# Patient Record
Sex: MALE | Race: WHITE | NOT HISPANIC OR LATINO | Employment: FULL TIME | URBAN - METROPOLITAN AREA
[De-identification: names, ages, dates, MRNs, and addresses within clinical notes are randomized per-mention and may not be internally consistent; named-entity substitution may affect disease eponyms.]

---

## 2023-07-07 LAB — PROSTATE SPECIFIC ANTIGEN,SCREEN: 2.58 NG/ML (ref 0–4)

## 2024-02-12 ENCOUNTER — OFFICE VISIT (OUTPATIENT)
Dept: UROLOGY | Facility: CLINIC | Age: 79
End: 2024-02-12
Payer: COMMERCIAL

## 2024-02-12 DIAGNOSIS — N40.0 BENIGN PROSTATIC HYPERPLASIA, UNSPECIFIED WHETHER LOWER URINARY TRACT SYMPTOMS PRESENT: ICD-10-CM

## 2024-02-12 LAB
POC BILIRUBIN, URINE: NEGATIVE
POC BLOOD, URINE: NEGATIVE
POC GLUCOSE, URINE: ABNORMAL MG/DL
POC KETONES, URINE: NEGATIVE MG/DL
POC LEUKOCYTES, URINE: NEGATIVE
POC NITRITE,URINE: NEGATIVE
POC PH, URINE: 5.5 PH
POC PROTEIN, URINE: NEGATIVE MG/DL
POC SPECIFIC GRAVITY, URINE: >=1.03
POC UROBILINOGEN, URINE: 0.2 EU/DL

## 2024-02-12 PROCEDURE — 99213 OFFICE O/P EST LOW 20 MIN: CPT | Performed by: UROLOGY

## 2024-02-12 PROCEDURE — 81003 URINALYSIS AUTO W/O SCOPE: CPT | Performed by: UROLOGY

## 2024-02-12 PROCEDURE — 1159F MED LIST DOCD IN RCRD: CPT | Performed by: UROLOGY

## 2024-02-12 RX ORDER — FINASTERIDE 5 MG/1
5 TABLET, FILM COATED ORAL
COMMUNITY
Start: 2024-02-07 | End: 2024-02-12

## 2024-02-12 RX ORDER — LISINOPRIL 5 MG/1
5 TABLET ORAL
COMMUNITY
Start: 2024-02-07 | End: 2024-05-07

## 2024-02-12 RX ORDER — FINASTERIDE 5 MG/1
5 TABLET, FILM COATED ORAL DAILY
Qty: 90 TABLET | Refills: 3 | Status: SHIPPED | OUTPATIENT
Start: 2024-02-12 | End: 2025-02-11

## 2024-02-12 RX ORDER — ATORVASTATIN CALCIUM 20 MG/1
20 TABLET, FILM COATED ORAL NIGHTLY
COMMUNITY

## 2024-02-12 RX ORDER — AMLODIPINE BESYLATE 10 MG/1
10 TABLET ORAL DAILY
COMMUNITY

## 2024-02-12 RX ORDER — METOPROLOL SUCCINATE 50 MG/1
50 TABLET, EXTENDED RELEASE ORAL
COMMUNITY
Start: 2024-02-07 | End: 2024-05-07

## 2024-02-12 NOTE — PROGRESS NOTES
02/12/2024  Voiding frequently but stable on Proscar daily, nocturia 3-4 does not bother, does drink a lot of water    Patient has no nausea, no vomiting, no fever.    GIOVANI: Deferred    PSA: No more    We discussed benign prostate hypertrophy with mild to moderate voiding symptoms of Proscar  We discussed cut back liquid intake during the day and at night  We discussed that no more PSA check  We discussed yearly GIOVANI with PCP  All the questions were answered, the patient expressed understanding and agreed to the plan.    Impression  BPH  Nocturia  Dysuria  PSA screening     Plan  Cut back liquid intake after 6 PM  Proscar 5 mg daily  No more PSA screening  Follow-up PCP yearly  Call if problem    Chief Complaint   Patient presents with    Benign Prostatic Hypertrophy    Acute Urinary Retention      Patient voiding 3-4 times at night maybe more.         Physical Exam     TODAYS LAB RESULTS:  POC Glucose, Urine  NEGATIVE mg/dl 100 (1+) Abnormal    POC Bilirubin, Urine  NEGATIVE NEGATIVE   POC Ketones, Urine  NEGATIVE mg/dl NEGATIVE   POC Specific Gravity, Urine  1.005 - 1.035 >=1.030   POC Blood, Urine  NEGATIVE NEGATIVE   POC PH, Urine  No Reference Range Established PH 5.5   POC Protein, Urine  NEGATIVE, 30 (1+) mg/dl NEGATIVE   POC Urobilinogen, Urine  0.2, 1.0 EU/DL 0.2   Poc Nitrite, Urine  NEGATIVE NEGATIVE   POC Leukocytes, Urine  NEGATIVE NEGATIVE       ASSESSMENT&PLAN:      IMPRESSIONS:     06/30/2023  77-year-old gentleman presents long history of dysuria, episode of postop retention, nocturia 4-6     Patient has no nausea, no vomiting, no fever.     Exam: Circumcised, normal penis normal testes bilaterally     GIOVANI: 1+, no nodule        New patient here for urinary retention Former patient of  Patient has frequency at night. During the day he is urinating fine. Patient gets 3-4 times a night. Patient denies hematuria, dysuria, he feels that he empties bladder well and he feels his stream is normal.      PVR: 15ml     Last PSA done 01/23/2018 2.72         IO Glucose - Urine Negative REQUIRED    IO Bilirubin Negative REQUIRED    IO Ketones Negative REQUIRED    IO Specific Gravity 1.025 REQUIRED    IO Blood Trace REQUIRED    IO pH 5.0 REQUIRED    IO Protein, Urine Negative REQUIRED    IO Urobilinogen Normal (0.2-1.0 mg/dl) REQUIRED    IO Nitrite, Urine Negative REQUIRED    IO Leukocytes Negative      We discussed the benign prostate hypertrophy with a moderate voiding symptoms, Flomax trial 0.4 mg daily, cut back liquid intake after 6 PM  We discussed the PSA screening, 1 more time  All the questions were answered, the patient expressed understanding and agreed to the plan.     Impression  BPH  Nocturia  Dysuria  PSA screening     Plan  Cut back liquid intake after 6 PM  Flomax 0.4 mg daily x30 days, call back for refill  PSA screening  Appointment in 1 year            PSA   7/7/2023 2.58  01/23/2018 2.72 plan

## 2024-03-29 ENCOUNTER — LAB (OUTPATIENT)
Dept: LAB | Facility: LAB | Age: 79
End: 2024-03-29
Payer: COMMERCIAL

## 2024-03-29 DIAGNOSIS — R73.9 HYPERGLYCEMIA, UNSPECIFIED: Primary | ICD-10-CM

## 2024-03-29 DIAGNOSIS — R53.82 CHRONIC FATIGUE, UNSPECIFIED: ICD-10-CM

## 2024-03-29 LAB
ALBUMIN SERPL BCP-MCNC: 4.1 G/DL (ref 3.4–5)
ALP SERPL-CCNC: 35 U/L (ref 33–136)
ALT SERPL W P-5'-P-CCNC: 23 U/L (ref 10–52)
ANION GAP SERPL CALC-SCNC: 11 MMOL/L (ref 10–20)
AST SERPL W P-5'-P-CCNC: 19 U/L (ref 9–39)
BILIRUB DIRECT SERPL-MCNC: 0.1 MG/DL (ref 0–0.3)
BILIRUB SERPL-MCNC: 0.6 MG/DL (ref 0–1.2)
BUN SERPL-MCNC: 22 MG/DL (ref 6–23)
CALCIUM SERPL-MCNC: 8.9 MG/DL (ref 8.6–10.3)
CHLORIDE SERPL-SCNC: 105 MMOL/L (ref 98–107)
CHOLEST SERPL-MCNC: 143 MG/DL (ref 0–199)
CHOLESTEROL/HDL RATIO: 3.7
CO2 SERPL-SCNC: 30 MMOL/L (ref 21–32)
CREAT SERPL-MCNC: 0.93 MG/DL (ref 0.5–1.3)
EGFRCR SERPLBLD CKD-EPI 2021: 85 ML/MIN/1.73M*2
ERYTHROCYTE [DISTWIDTH] IN BLOOD BY AUTOMATED COUNT: 12.6 % (ref 11.5–14.5)
GLUCOSE SERPL-MCNC: 74 MG/DL (ref 74–99)
HCT VFR BLD AUTO: 41 % (ref 41–52)
HDLC SERPL-MCNC: 38.8 MG/DL
HGB BLD-MCNC: 13.3 G/DL (ref 13.5–17.5)
LDLC SERPL CALC-MCNC: 64 MG/DL
MCH RBC QN AUTO: 30.4 PG (ref 26–34)
MCHC RBC AUTO-ENTMCNC: 32.4 G/DL (ref 32–36)
MCV RBC AUTO: 94 FL (ref 80–100)
NON HDL CHOLESTEROL: 104 MG/DL (ref 0–149)
NRBC BLD-RTO: 0 /100 WBCS (ref 0–0)
PHOSPHATE SERPL-MCNC: 4.5 MG/DL (ref 2.5–4.9)
PLATELET # BLD AUTO: 225 X10*3/UL (ref 150–450)
POTASSIUM SERPL-SCNC: 4.1 MMOL/L (ref 3.5–5.3)
PROT SERPL-MCNC: 6.6 G/DL (ref 6.4–8.2)
RBC # BLD AUTO: 4.37 X10*6/UL (ref 4.5–5.9)
SODIUM SERPL-SCNC: 142 MMOL/L (ref 136–145)
TRIGL SERPL-MCNC: 203 MG/DL (ref 0–149)
VLDL: 41 MG/DL (ref 0–40)
WBC # BLD AUTO: 7.9 X10*3/UL (ref 4.4–11.3)

## 2024-03-29 PROCEDURE — 84100 ASSAY OF PHOSPHORUS: CPT

## 2024-03-29 PROCEDURE — 85027 COMPLETE CBC AUTOMATED: CPT

## 2024-03-29 PROCEDURE — 80053 COMPREHEN METABOLIC PANEL: CPT

## 2024-03-29 PROCEDURE — 82248 BILIRUBIN DIRECT: CPT

## 2024-03-29 PROCEDURE — 80061 LIPID PANEL: CPT

## 2024-03-29 PROCEDURE — 83036 HEMOGLOBIN GLYCOSYLATED A1C: CPT

## 2024-03-29 PROCEDURE — 36415 COLL VENOUS BLD VENIPUNCTURE: CPT

## 2024-03-30 LAB
EST. AVERAGE GLUCOSE BLD GHB EST-MCNC: 146 MG/DL
HBA1C MFR BLD: 6.7 %

## 2024-07-12 ENCOUNTER — APPOINTMENT (OUTPATIENT)
Dept: UROLOGY | Facility: CLINIC | Age: 79
End: 2024-07-12
Payer: COMMERCIAL

## 2024-07-12 VITALS
WEIGHT: 201 LBS | SYSTOLIC BLOOD PRESSURE: 158 MMHG | BODY MASS INDEX: 29.77 KG/M2 | HEART RATE: 76 BPM | HEIGHT: 69 IN | DIASTOLIC BLOOD PRESSURE: 77 MMHG

## 2024-07-12 DIAGNOSIS — N40.0 BENIGN PROSTATIC HYPERPLASIA, UNSPECIFIED WHETHER LOWER URINARY TRACT SYMPTOMS PRESENT: Primary | ICD-10-CM

## 2024-07-12 PROCEDURE — 99213 OFFICE O/P EST LOW 20 MIN: CPT | Performed by: UROLOGY

## 2024-07-12 PROCEDURE — 1159F MED LIST DOCD IN RCRD: CPT | Performed by: UROLOGY

## 2024-07-12 NOTE — PROGRESS NOTES
07/12/2024  Voiding well no complain, nocturia x 2 does not bother    Patient has no nausea, no vomiting, no fever.    GIOVANI: Deferred    PSA: No more    We discussed benign prostate hypertrophy with mild voiding symptom.  Continue Proscar  We discussed no more PSA check due to the age  All the questions were answered, the patient expressed understanding and agreed to the plan.    Impression  BPH  Nocturia  Dysuria  PSA screening     Plan  Cut back liquid intake after 6 PM  Proscar 5 mg daily  No more PSA screening  Follow-up PCP yearly  Call if problem      Chief Complaint   Patient presents with    Benign Prostatic Hypertrophy     Patient is here today year check bph.        Physical Exam     TODAYS LAB RESULTS:    Lab Results   Component Value Date    PSA 2.72 01/23/2018      ASSESSMENT&PLAN:      IMPRESSIONS:      02/12/2024  Voiding frequently but stable on Proscar daily, nocturia 3-4 does not bother, does drink a lot of water     Patient has no nausea, no vomiting, no fever.     GIOVANI: Deferred     PSA: No more     We discussed benign prostate hypertrophy with mild to moderate voiding symptoms of Proscar  We discussed cut back liquid intake during the day and at night  We discussed that no more PSA check  We discussed yearly GIOVANI with PCP  All the questions were answered, the patient expressed understanding and agreed to the plan.     Impression  BPH  Nocturia  Dysuria  PSA screening     Plan  Cut back liquid intake after 6 PM  Proscar 5 mg daily  No more PSA screening  Follow-up PCP yearly  Call if problem          Chief Complaint   Patient presents with    Benign Prostatic Hypertrophy    Acute Urinary Retention        Patient voiding 3-4 times at night maybe more.          Physical Exam      TODAYS LAB RESULTS:  POC Glucose, Urine  NEGATIVE mg/dl 100 (1+) Abnormal    POC Bilirubin, Urine  NEGATIVE NEGATIVE   POC Ketones, Urine  NEGATIVE mg/dl NEGATIVE   POC Specific Gravity, Urine  1.005 - 1.035 >=1.030   POC  Blood, Urine  NEGATIVE NEGATIVE   POC PH, Urine  No Reference Range Established PH 5.5   POC Protein, Urine  NEGATIVE, 30 (1+) mg/dl NEGATIVE   POC Urobilinogen, Urine  0.2, 1.0 EU/DL 0.2   Poc Nitrite, Urine  NEGATIVE NEGATIVE   POC Leukocytes, Urine  NEGATIVE NEGATIVE         ASSESSMENT&PLAN:        IMPRESSIONS:      06/30/2023  77-year-old gentleman presents long history of dysuria, episode of postop retention, nocturia 4-6     Patient has no nausea, no vomiting, no fever.     Exam: Circumcised, normal penis normal testes bilaterally     GIOVANI: 1+, no nodule        New patient here for urinary retention Former patient of  Patient has frequency at night. During the day he is urinating fine. Patient gets 3-4 times a night. Patient denies hematuria, dysuria, he feels that he empties bladder well and he feels his stream is normal.     PVR: 15ml     Last PSA done 01/23/2018 2.72         IO Glucose - Urine Negative REQUIRED    IO Bilirubin Negative REQUIRED    IO Ketones Negative REQUIRED    IO Specific Gravity 1.025 REQUIRED    IO Blood Trace REQUIRED    IO pH 5.0 REQUIRED    IO Protein, Urine Negative REQUIRED    IO Urobilinogen Normal (0.2-1.0 mg/dl) REQUIRED    IO Nitrite, Urine Negative REQUIRED    IO Leukocytes Negative      We discussed the benign prostate hypertrophy with a moderate voiding symptoms, Flomax trial 0.4 mg daily, cut back liquid intake after 6 PM  We discussed the PSA screening, 1 more time  All the questions were answered, the patient expressed understanding and agreed to the plan.     Impression  BPH  Nocturia  Dysuria  PSA screening     Plan  Cut back liquid intake after 6 PM  Flomax 0.4 mg daily x30 days, call back for refill  PSA screening  Appointment in 1 year            PSA   7/7/2023 2.58  01/23/2018 2.72 plan

## 2024-08-02 ENCOUNTER — LAB (OUTPATIENT)
Dept: LAB | Facility: LAB | Age: 79
End: 2024-08-02
Payer: COMMERCIAL

## 2024-08-02 DIAGNOSIS — I10 ESSENTIAL (PRIMARY) HYPERTENSION: ICD-10-CM

## 2024-08-02 DIAGNOSIS — E11.9 TYPE 2 DIABETES MELLITUS WITHOUT COMPLICATIONS (MULTI): Primary | ICD-10-CM

## 2024-08-02 LAB
ANION GAP SERPL CALC-SCNC: 15 MMOL/L (ref 10–20)
BUN SERPL-MCNC: 17 MG/DL (ref 6–23)
CALCIUM SERPL-MCNC: 8.9 MG/DL (ref 8.6–10.6)
CHLORIDE SERPL-SCNC: 106 MMOL/L (ref 98–107)
CO2 SERPL-SCNC: 27 MMOL/L (ref 21–32)
CREAT SERPL-MCNC: 0.96 MG/DL (ref 0.5–1.3)
CREAT UR-MCNC: 177.7 MG/DL (ref 20–370)
EGFRCR SERPLBLD CKD-EPI 2021: 81 ML/MIN/1.73M*2
EST. AVERAGE GLUCOSE BLD GHB EST-MCNC: 131 MG/DL
GLUCOSE SERPL-MCNC: 115 MG/DL (ref 74–99)
HBA1C MFR BLD: 6.2 %
MICROALBUMIN UR-MCNC: 13.2 MG/L
MICROALBUMIN/CREAT UR: 7.4 UG/MG CREAT
POTASSIUM SERPL-SCNC: 3.8 MMOL/L (ref 3.5–5.3)
SODIUM SERPL-SCNC: 144 MMOL/L (ref 136–145)

## 2024-08-02 PROCEDURE — 80048 BASIC METABOLIC PNL TOTAL CA: CPT

## 2024-08-02 PROCEDURE — 82043 UR ALBUMIN QUANTITATIVE: CPT

## 2024-08-02 PROCEDURE — 36415 COLL VENOUS BLD VENIPUNCTURE: CPT

## 2024-08-02 PROCEDURE — 82570 ASSAY OF URINE CREATININE: CPT

## 2024-08-02 PROCEDURE — 83036 HEMOGLOBIN GLYCOSYLATED A1C: CPT

## 2024-12-12 ENCOUNTER — APPOINTMENT (OUTPATIENT)
Dept: RADIOLOGY | Facility: HOSPITAL | Age: 79
End: 2024-12-12
Payer: COMMERCIAL

## 2024-12-12 ENCOUNTER — APPOINTMENT (OUTPATIENT)
Dept: CARDIOLOGY | Facility: HOSPITAL | Age: 79
End: 2024-12-12
Payer: COMMERCIAL

## 2024-12-12 ENCOUNTER — HOSPITAL ENCOUNTER (OUTPATIENT)
Facility: HOSPITAL | Age: 79
Setting detail: OBSERVATION
Discharge: HOME | End: 2024-12-14
Attending: STUDENT IN AN ORGANIZED HEALTH CARE EDUCATION/TRAINING PROGRAM | Admitting: FAMILY MEDICINE
Payer: COMMERCIAL

## 2024-12-12 DIAGNOSIS — R33.8 ACUTE URINARY RETENTION: ICD-10-CM

## 2024-12-12 DIAGNOSIS — R06.02 SHORTNESS OF BREATH: Primary | ICD-10-CM

## 2024-12-12 DIAGNOSIS — R29.90 STROKE-LIKE SYMPTOMS: ICD-10-CM

## 2024-12-12 PROBLEM — L90.5 SCAR CONDITION AND FIBROSIS OF SKIN: Status: ACTIVE | Noted: 2021-07-06

## 2024-12-12 PROBLEM — D68.9 COAGULOPATHY (MULTI): Status: ACTIVE | Noted: 2022-08-05

## 2024-12-12 PROBLEM — R56.9 SEIZURE (MULTI): Status: ACTIVE | Noted: 2024-02-16

## 2024-12-12 PROBLEM — R31.9 HEMATURIA: Status: ACTIVE | Noted: 2022-08-05

## 2024-12-12 PROBLEM — N40.0 BENIGN PROSTATIC HYPERPLASIA: Status: ACTIVE | Noted: 2024-12-12

## 2024-12-12 PROBLEM — C43.59 MALIGNANT MELANOMA OF OTHER PART OF TRUNK: Status: ACTIVE | Noted: 2021-07-06

## 2024-12-12 LAB
ALBUMIN SERPL BCP-MCNC: 3.7 G/DL (ref 3.4–5)
ALP SERPL-CCNC: 31 U/L (ref 33–136)
ALT SERPL W P-5'-P-CCNC: 19 U/L (ref 10–52)
ANION GAP SERPL CALC-SCNC: 12 MMOL/L (ref 10–20)
APPEARANCE UR: CLEAR
AST SERPL W P-5'-P-CCNC: 24 U/L (ref 9–39)
BASOPHILS # BLD AUTO: 0.03 X10*3/UL (ref 0–0.1)
BASOPHILS NFR BLD AUTO: 0.4 %
BILIRUB SERPL-MCNC: 0.4 MG/DL (ref 0–1.2)
BILIRUB UR STRIP.AUTO-MCNC: NEGATIVE MG/DL
BUN SERPL-MCNC: 26 MG/DL (ref 6–23)
CALCIUM SERPL-MCNC: 8.7 MG/DL (ref 8.6–10.3)
CARDIAC TROPONIN I PNL SERPL HS: 7 NG/L (ref 0–20)
CARDIAC TROPONIN I PNL SERPL HS: 8 NG/L (ref 0–20)
CHLORIDE SERPL-SCNC: 106 MMOL/L (ref 98–107)
CO2 SERPL-SCNC: 28 MMOL/L (ref 21–32)
COLOR UR: ABNORMAL
CREAT SERPL-MCNC: 0.89 MG/DL (ref 0.5–1.3)
D DIMER PPP FEU-MCNC: 1094 NG/ML FEU
EGFRCR SERPLBLD CKD-EPI 2021: 88 ML/MIN/1.73M*2
EOSINOPHIL # BLD AUTO: 0.19 X10*3/UL (ref 0–0.4)
EOSINOPHIL NFR BLD AUTO: 2.3 %
ERYTHROCYTE [DISTWIDTH] IN BLOOD BY AUTOMATED COUNT: 12.9 % (ref 11.5–14.5)
FLUAV RNA RESP QL NAA+PROBE: NOT DETECTED
FLUBV RNA RESP QL NAA+PROBE: NOT DETECTED
GLUCOSE SERPL-MCNC: 88 MG/DL (ref 74–99)
GLUCOSE UR STRIP.AUTO-MCNC: NORMAL MG/DL
HCT VFR BLD AUTO: 42.5 % (ref 41–52)
HGB BLD-MCNC: 13.9 G/DL (ref 13.5–17.5)
IMM GRANULOCYTES # BLD AUTO: 0.02 X10*3/UL (ref 0–0.5)
IMM GRANULOCYTES NFR BLD AUTO: 0.2 % (ref 0–0.9)
KETONES UR STRIP.AUTO-MCNC: NEGATIVE MG/DL
LEUKOCYTE ESTERASE UR QL STRIP.AUTO: NEGATIVE
LYMPHOCYTES # BLD AUTO: 1.76 X10*3/UL (ref 0.8–3)
LYMPHOCYTES NFR BLD AUTO: 21 %
MAGNESIUM SERPL-MCNC: 2.1 MG/DL (ref 1.6–2.4)
MCH RBC QN AUTO: 30.8 PG (ref 26–34)
MCHC RBC AUTO-ENTMCNC: 32.7 G/DL (ref 32–36)
MCV RBC AUTO: 94 FL (ref 80–100)
MONOCYTES # BLD AUTO: 0.88 X10*3/UL (ref 0.05–0.8)
MONOCYTES NFR BLD AUTO: 10.5 %
MUCOUS THREADS #/AREA URNS AUTO: ABNORMAL /LPF
NEUTROPHILS # BLD AUTO: 5.52 X10*3/UL (ref 1.6–5.5)
NEUTROPHILS NFR BLD AUTO: 65.6 %
NITRITE UR QL STRIP.AUTO: NEGATIVE
NRBC BLD-RTO: 0 /100 WBCS (ref 0–0)
PH UR STRIP.AUTO: 6 [PH]
PLATELET # BLD AUTO: 183 X10*3/UL (ref 150–450)
POTASSIUM SERPL-SCNC: 4.7 MMOL/L (ref 3.5–5.3)
PROT SERPL-MCNC: 6.9 G/DL (ref 6.4–8.2)
PROT UR STRIP.AUTO-MCNC: ABNORMAL MG/DL
RBC # BLD AUTO: 4.51 X10*6/UL (ref 4.5–5.9)
RBC # UR STRIP.AUTO: ABNORMAL /UL
RBC #/AREA URNS AUTO: >20 /HPF
RSV RNA RESP QL NAA+PROBE: NOT DETECTED
SARS-COV-2 RNA RESP QL NAA+PROBE: NOT DETECTED
SODIUM SERPL-SCNC: 141 MMOL/L (ref 136–145)
SP GR UR STRIP.AUTO: >1.05
UROBILINOGEN UR STRIP.AUTO-MCNC: NORMAL MG/DL
WBC # BLD AUTO: 8.4 X10*3/UL (ref 4.4–11.3)
WBC #/AREA URNS AUTO: ABNORMAL /HPF

## 2024-12-12 PROCEDURE — 2500000001 HC RX 250 WO HCPCS SELF ADMINISTERED DRUGS (ALT 637 FOR MEDICARE OP)

## 2024-12-12 PROCEDURE — G0378 HOSPITAL OBSERVATION PER HR: HCPCS

## 2024-12-12 PROCEDURE — 80053 COMPREHEN METABOLIC PANEL: CPT

## 2024-12-12 PROCEDURE — 71275 CT ANGIOGRAPHY CHEST: CPT | Performed by: RADIOLOGY

## 2024-12-12 PROCEDURE — 81001 URINALYSIS AUTO W/SCOPE: CPT

## 2024-12-12 PROCEDURE — 85025 COMPLETE CBC W/AUTO DIFF WBC: CPT

## 2024-12-12 PROCEDURE — 36415 COLL VENOUS BLD VENIPUNCTURE: CPT

## 2024-12-12 PROCEDURE — 71275 CT ANGIOGRAPHY CHEST: CPT

## 2024-12-12 PROCEDURE — 2550000001 HC RX 255 CONTRASTS: Performed by: STUDENT IN AN ORGANIZED HEALTH CARE EDUCATION/TRAINING PROGRAM

## 2024-12-12 PROCEDURE — 85379 FIBRIN DEGRADATION QUANT: CPT

## 2024-12-12 PROCEDURE — 71046 X-RAY EXAM CHEST 2 VIEWS: CPT | Performed by: RADIOLOGY

## 2024-12-12 PROCEDURE — 83880 ASSAY OF NATRIURETIC PEPTIDE: CPT

## 2024-12-12 PROCEDURE — 84484 ASSAY OF TROPONIN QUANT: CPT

## 2024-12-12 PROCEDURE — 99285 EMERGENCY DEPT VISIT HI MDM: CPT | Mod: 25 | Performed by: STUDENT IN AN ORGANIZED HEALTH CARE EDUCATION/TRAINING PROGRAM

## 2024-12-12 PROCEDURE — 71046 X-RAY EXAM CHEST 2 VIEWS: CPT

## 2024-12-12 PROCEDURE — 93005 ELECTROCARDIOGRAM TRACING: CPT

## 2024-12-12 PROCEDURE — 83735 ASSAY OF MAGNESIUM: CPT

## 2024-12-12 PROCEDURE — 87637 SARSCOV2&INF A&B&RSV AMP PRB: CPT

## 2024-12-12 RX ORDER — METOPROLOL SUCCINATE 50 MG/1
50 TABLET, EXTENDED RELEASE ORAL ONCE
Status: COMPLETED | OUTPATIENT
Start: 2024-12-12 | End: 2024-12-12

## 2024-12-12 RX ORDER — AMLODIPINE BESYLATE 10 MG/1
10 TABLET ORAL ONCE
Status: COMPLETED | OUTPATIENT
Start: 2024-12-12 | End: 2024-12-12

## 2024-12-12 RX ORDER — LIDOCAINE HYDROCHLORIDE 20 MG/ML
1 JELLY TOPICAL ONCE
Status: DISCONTINUED | OUTPATIENT
Start: 2024-12-12 | End: 2024-12-14 | Stop reason: HOSPADM

## 2024-12-12 SDOH — SOCIAL STABILITY: SOCIAL INSECURITY
WITHIN THE LAST YEAR, HAVE YOU BEEN KICKED, HIT, SLAPPED, OR OTHERWISE PHYSICALLY HURT BY YOUR PARTNER OR EX-PARTNER?: NO

## 2024-12-12 SDOH — SOCIAL STABILITY: SOCIAL INSECURITY
WITHIN THE LAST YEAR, HAVE YOU BEEN RAPED OR FORCED TO HAVE ANY KIND OF SEXUAL ACTIVITY BY YOUR PARTNER OR EX-PARTNER?: NO

## 2024-12-12 SDOH — SOCIAL STABILITY: SOCIAL INSECURITY: ABUSE: ADULT

## 2024-12-12 SDOH — SOCIAL STABILITY: SOCIAL INSECURITY: WITHIN THE LAST YEAR, HAVE YOU BEEN AFRAID OF YOUR PARTNER OR EX-PARTNER?: NO

## 2024-12-12 SDOH — ECONOMIC STABILITY: FOOD INSECURITY: WITHIN THE PAST 12 MONTHS, THE FOOD YOU BOUGHT JUST DIDN'T LAST AND YOU DIDN'T HAVE MONEY TO GET MORE.: NEVER TRUE

## 2024-12-12 SDOH — SOCIAL STABILITY: SOCIAL INSECURITY: HAVE YOU HAD ANY THOUGHTS OF HARMING ANYONE ELSE?: NO

## 2024-12-12 SDOH — SOCIAL STABILITY: SOCIAL INSECURITY: ARE YOU OR HAVE YOU BEEN THREATENED OR ABUSED PHYSICALLY, EMOTIONALLY, OR SEXUALLY BY ANYONE?: NO

## 2024-12-12 SDOH — SOCIAL STABILITY: SOCIAL INSECURITY: ARE THERE ANY APPARENT SIGNS OF INJURIES/BEHAVIORS THAT COULD BE RELATED TO ABUSE/NEGLECT?: NO

## 2024-12-12 SDOH — ECONOMIC STABILITY: FOOD INSECURITY: WITHIN THE PAST 12 MONTHS, YOU WORRIED THAT YOUR FOOD WOULD RUN OUT BEFORE YOU GOT THE MONEY TO BUY MORE.: NEVER TRUE

## 2024-12-12 SDOH — ECONOMIC STABILITY: INCOME INSECURITY: IN THE PAST 12 MONTHS HAS THE ELECTRIC, GAS, OIL, OR WATER COMPANY THREATENED TO SHUT OFF SERVICES IN YOUR HOME?: NO

## 2024-12-12 SDOH — SOCIAL STABILITY: SOCIAL INSECURITY: WITHIN THE LAST YEAR, HAVE YOU BEEN HUMILIATED OR EMOTIONALLY ABUSED IN OTHER WAYS BY YOUR PARTNER OR EX-PARTNER?: NO

## 2024-12-12 SDOH — SOCIAL STABILITY: SOCIAL INSECURITY: HAS ANYONE EVER THREATENED TO HURT YOUR FAMILY OR YOUR PETS?: NO

## 2024-12-12 SDOH — SOCIAL STABILITY: SOCIAL INSECURITY: DO YOU FEEL ANYONE HAS EXPLOITED OR TAKEN ADVANTAGE OF YOU FINANCIALLY OR OF YOUR PERSONAL PROPERTY?: NO

## 2024-12-12 SDOH — SOCIAL STABILITY: SOCIAL INSECURITY: DO YOU FEEL UNSAFE GOING BACK TO THE PLACE WHERE YOU ARE LIVING?: NO

## 2024-12-12 SDOH — SOCIAL STABILITY: SOCIAL INSECURITY: DOES ANYONE TRY TO KEEP YOU FROM HAVING/CONTACTING OTHER FRIENDS OR DOING THINGS OUTSIDE YOUR HOME?: NO

## 2024-12-12 SDOH — SOCIAL STABILITY: SOCIAL INSECURITY: HAVE YOU HAD THOUGHTS OF HARMING ANYONE ELSE?: NO

## 2024-12-12 ASSESSMENT — LIFESTYLE VARIABLES
AUDIT-C TOTAL SCORE: 0
HOW OFTEN DO YOU HAVE A DRINK CONTAINING ALCOHOL: NEVER
SKIP TO QUESTIONS 9-10: 1
HOW OFTEN DO YOU HAVE 6 OR MORE DRINKS ON ONE OCCASION: NEVER
AUDIT-C TOTAL SCORE: 0
HOW MANY STANDARD DRINKS CONTAINING ALCOHOL DO YOU HAVE ON A TYPICAL DAY: PATIENT DOES NOT DRINK

## 2024-12-12 ASSESSMENT — PAIN SCALES - GENERAL
PAINLEVEL_OUTOF10: 0 - NO PAIN

## 2024-12-12 ASSESSMENT — COGNITIVE AND FUNCTIONAL STATUS - GENERAL
MOBILITY SCORE: 24
PATIENT BASELINE BEDBOUND: NO
DAILY ACTIVITIY SCORE: 24

## 2024-12-12 ASSESSMENT — ACTIVITIES OF DAILY LIVING (ADL)
FEEDING YOURSELF: INDEPENDENT
LACK_OF_TRANSPORTATION: NO
HEARING - LEFT EAR: FUNCTIONAL
BATHING: INDEPENDENT
WALKS IN HOME: INDEPENDENT
GROOMING: INDEPENDENT
TOILETING: INDEPENDENT
ASSISTIVE_DEVICE: EYEGLASSES
JUDGMENT_ADEQUATE_SAFELY_COMPLETE_DAILY_ACTIVITIES: YES
DRESSING YOURSELF: INDEPENDENT
ADEQUATE_TO_COMPLETE_ADL: YES
PATIENT'S MEMORY ADEQUATE TO SAFELY COMPLETE DAILY ACTIVITIES?: YES
HEARING - RIGHT EAR: FUNCTIONAL

## 2024-12-12 ASSESSMENT — PATIENT HEALTH QUESTIONNAIRE - PHQ9
SUM OF ALL RESPONSES TO PHQ9 QUESTIONS 1 & 2: 0
1. LITTLE INTEREST OR PLEASURE IN DOING THINGS: NOT AT ALL
2. FEELING DOWN, DEPRESSED OR HOPELESS: NOT AT ALL

## 2024-12-12 ASSESSMENT — PAIN - FUNCTIONAL ASSESSMENT: PAIN_FUNCTIONAL_ASSESSMENT: 0-10

## 2024-12-12 NOTE — ED TRIAGE NOTES
PT is A/Ox4 coming in for shortness of breath. PT was recently on a cruise ship and was seen in the ER on the ship and again the [place] and was told he had a plural effusion. PT was [place] the [place] and is feeling  short of breath. PT also had a folly cathter placed for retention  also while in the [place].

## 2024-12-12 NOTE — ED PROVIDER NOTES
History of Present Illness     History provided by: Patient  Limitations to History: None  External Records Reviewed with Brief Summary:  Discharge summary    HPI:  Cristiano Duarte is a 79 y.o. male with history of BPH, hypertension Keppra hypercholesterolemia that presents emergency room for his breath.  Patient states that he was recently on a cruise ship in the [place] and states that he was having some increased shortness of breath on exertion.  Patient to a hospital in the [facility] and was evaluated in the hospital.  Patient states that during this time he also presented with some altered mental status, slow to response, dysarthria, bilateral lower extremity weakness.  This episode lasted for several hours in the outside hospital.  Per family, patient was told that he had pleural effusion.  Patient also received Levaquin for presumed pneumonia.  They did a CT head and CT PE which was negative.  Patient continues to have shortness of breath [place] the [place].  Patient was also found to have urinary retention at the outside hospital had a Andrews that was placed.  Patient also states that he stopped taking his hypertensive medication and had a blood pressure of systolics of 200 while in the emergency room at outside hospital.    Physical Exam   Triage vitals:  T 36.2 °C (97.2 °F)  HR 91  /68  RR 16  O2 99 %      General: Awake, alert, in no acute distress  Eyes: Gaze conjugate.  No scleral icterus or injection  HENT: Normo-cephalic, atraumatic. No stridor  CV: Regular rate, regular rhythm. Radial pulses 2+ bilaterally  Resp: Breathing non-labored, speaking in full sentences.  Clear to auscultation bilaterally  GI: Soft, non-distended, non-tender. No rebound or guarding.  : Andrews in place  MSK/Extremities: No gross bony deformities. Moving all extremities  Skin: Warm. Appropriate color  Neuro: Alert. Oriented. Face symmetric. Speech is fluent.  Gross strength and sensation  intact in b/l UE and LEs  Psych: Appropriate mood and affect    Medical Decision Making & ED Course   Medical Decision Makin y.o. male with history of BPH, hypertension Keppra hypercholesterolemia that presents emergency room for his shortness of breath along with an episode of dysarthria, lower extremity weakness.  Patient presents vitally stable, nontoxic-appearing to the emergency room.  Differential diagnosis includes pneumonia, pleural effusion, pleurisy's, pulmonary embolism, COVID, RSV, influenza, congestive heart failure, MI, TIA, stroke.  On evaluation, patient presents Van negative, NIH of 0 which makes this less concern for a ischemic stroke or hemorrhagic stroke.  However patient does state that about 3 days ago when he was being evaluated in the [place], patient was having some notable lower extremity weakness, dysarthria which lasted for roughly 3 hours.  Patient states that he was also having some increased shortness of breath and was determined to have pneumonia at the outside hospital was started on Levaquin.  Patient here in the emergency room is afebrile, has no increased shortness of breath, increased work of breathing, is not hypoxic and is on room air satting at 98%.  CMP, CBC, magnesium is unremarkable.  BMP is within normal limits.  RSV, COVID, influenza is negative.  2 troponins are negative.  EKG shows no evidence of ischemic changes which makes this less concerning for an MI.  Urinalysis showed no evidence of urinary tract infection.  Chest x-ray that showed no acute infiltrate or evidence of pneumothorax.  D-dimer was elevated at 1000 therefore we did a CT PE.  CT PE showed no evidence of pulmonary embolism and no evidence of pneumonia.  At the outside hospital, patient was reportedly found to have urinary retention.  However here in the emergency room, urinary Andrews was taken out and patient is able to urinate without difficulty.  Slightly hypertensive initially in the  emergency room however was given of metoprolol amlodipine.  Patient was revitalized and was found to have a blood pressure of 163/74. We found no evidence or indication of the pulmonary pathology that would give us any reason as to why patient was short of breath about 3 days ago.  Patient is not complaining of any of those symptoms currently.  However, the story of dysarthria, bilateral lower extremity weakness is concerning for a TIA.  Given this concerning story, patient will be admitted to the hospital for a TIA workup.       Social Determinants of Health which Significantly Impact Care: None identified The following actions were taken to address these social determinants: None    EKG Independent Interpretation: EKG interpreted by myself. Please see ED Course for full interpretation.    Independent Result Review and Interpretation: Relevant laboratory and radiographic results were reviewed and independently interpreted by myself.  As necessary, they are commented on in the ED Course.    Chronic conditions affecting the patient's care: As documented above in [facility]    The patient was discussed with the following consultants/services: None    Care Considerations: As documented above in [facility]    ED Course:  ED Course as of 12/14/24 1401   Thu Dec 12, 2024   1337 EKG, interpreted me: Sinus rhythm, rate 53 bpm.  Normal axis.  No acute ST elevation.  T wave inversions lead III, isolated.  QTc 441.  Bifascicular block, unchanged from prior EKG in 2023.  No evidence of acute STEMI. [JG]   1438 Chest Xray shows no focal infiltrate or pneumothorax. [YG]   1439 CBC shows no leukocytosis, no anemia. [YG]   1501 Magnesium, CMP is within normal is.  D-dimer is elevated at more than 1000 therefore CT PE was ordered. [YG]   1535 Serial Troponin is negative.  Patient does have a Andrews in place however Andrews has been taken out. [YG]   1716 CT PE shows no pulmonary embolus is noted. [YG]      ED Course User Index  [JG] Monique SMALLS  MD Carol  [YG] Shira Dave MD         Diagnoses as of 12/14/24 1401   Shortness of breath   Stroke-like symptoms     Disposition   As a result of their workup, the patient will require admission to the hospital.  The patient was informed of his diagnosis.  The patient was given the opportunity to ask questions and I answered them. The patient agreed to be admitted to the hospital.    Procedures   Procedures    Patient seen and discussed with ED attending physician.    Shira Dave MD  Emergency Medicine     Shira Dave MD  Resident  12/14/24 9912     Purse String (Intermediate) Text: Given the location of the defect and the characteristics of the surrounding skin a purse string intermediate closure was deemed most appropriate.  Undermining was performed circumfirentially around the surgical defect.  A purse string suture was then placed and tightened.

## 2024-12-13 ENCOUNTER — APPOINTMENT (OUTPATIENT)
Dept: RADIOLOGY | Facility: HOSPITAL | Age: 79
End: 2024-12-13
Payer: COMMERCIAL

## 2024-12-13 PROBLEM — G45.9 TIA (TRANSIENT ISCHEMIC ATTACK): Status: ACTIVE | Noted: 2024-12-13

## 2024-12-13 LAB
ANION GAP SERPL CALC-SCNC: 11 MMOL/L (ref 10–20)
BUN SERPL-MCNC: 21 MG/DL (ref 6–23)
CALCIUM SERPL-MCNC: 9.1 MG/DL (ref 8.6–10.3)
CHLORIDE SERPL-SCNC: 106 MMOL/L (ref 98–107)
CO2 SERPL-SCNC: 26 MMOL/L (ref 21–32)
CREAT SERPL-MCNC: 0.89 MG/DL (ref 0.5–1.3)
EGFRCR SERPLBLD CKD-EPI 2021: 88 ML/MIN/1.73M*2
ERYTHROCYTE [DISTWIDTH] IN BLOOD BY AUTOMATED COUNT: 12.6 % (ref 11.5–14.5)
GLUCOSE SERPL-MCNC: 110 MG/DL (ref 74–99)
HCT VFR BLD AUTO: 43.7 % (ref 41–52)
HGB BLD-MCNC: 14.5 G/DL (ref 13.5–17.5)
MCH RBC QN AUTO: 30.5 PG (ref 26–34)
MCHC RBC AUTO-ENTMCNC: 33.2 G/DL (ref 32–36)
MCV RBC AUTO: 92 FL (ref 80–100)
NRBC BLD-RTO: 0 /100 WBCS (ref 0–0)
PLATELET # BLD AUTO: 228 X10*3/UL (ref 150–450)
POTASSIUM SERPL-SCNC: 4.2 MMOL/L (ref 3.5–5.3)
RBC # BLD AUTO: 4.76 X10*6/UL (ref 4.5–5.9)
SODIUM SERPL-SCNC: 139 MMOL/L (ref 136–145)
WBC # BLD AUTO: 7.5 X10*3/UL (ref 4.4–11.3)

## 2024-12-13 PROCEDURE — 80048 BASIC METABOLIC PNL TOTAL CA: CPT | Performed by: FAMILY MEDICINE

## 2024-12-13 PROCEDURE — 70551 MRI BRAIN STEM W/O DYE: CPT | Performed by: RADIOLOGY

## 2024-12-13 PROCEDURE — 99222 1ST HOSP IP/OBS MODERATE 55: CPT | Performed by: PSYCHIATRY & NEUROLOGY

## 2024-12-13 PROCEDURE — 2500000002 HC RX 250 W HCPCS SELF ADMINISTERED DRUGS (ALT 637 FOR MEDICARE OP, ALT 636 FOR OP/ED): Performed by: NURSE PRACTITIONER

## 2024-12-13 PROCEDURE — 70547 MR ANGIOGRAPHY NECK W/O DYE: CPT

## 2024-12-13 PROCEDURE — 70544 MR ANGIOGRAPHY HEAD W/O DYE: CPT | Performed by: RADIOLOGY

## 2024-12-13 PROCEDURE — 70544 MR ANGIOGRAPHY HEAD W/O DYE: CPT | Mod: 59

## 2024-12-13 PROCEDURE — 2500000004 HC RX 250 GENERAL PHARMACY W/ HCPCS (ALT 636 FOR OP/ED): Performed by: FAMILY MEDICINE

## 2024-12-13 PROCEDURE — 85027 COMPLETE CBC AUTOMATED: CPT | Performed by: FAMILY MEDICINE

## 2024-12-13 PROCEDURE — 70551 MRI BRAIN STEM W/O DYE: CPT

## 2024-12-13 PROCEDURE — 36415 COLL VENOUS BLD VENIPUNCTURE: CPT | Performed by: FAMILY MEDICINE

## 2024-12-13 PROCEDURE — 70547 MR ANGIOGRAPHY NECK W/O DYE: CPT | Performed by: RADIOLOGY

## 2024-12-13 PROCEDURE — 2500000001 HC RX 250 WO HCPCS SELF ADMINISTERED DRUGS (ALT 637 FOR MEDICARE OP): Performed by: FAMILY MEDICINE

## 2024-12-13 PROCEDURE — 96372 THER/PROPH/DIAG INJ SC/IM: CPT | Performed by: FAMILY MEDICINE

## 2024-12-13 PROCEDURE — G0378 HOSPITAL OBSERVATION PER HR: HCPCS

## 2024-12-13 RX ORDER — TERBINAFINE HYDROCHLORIDE 250 MG/1
250 TABLET ORAL DAILY
COMMUNITY
End: 2024-12-14 | Stop reason: HOSPADM

## 2024-12-13 RX ORDER — WATER
250 LIQUID (ML) MISCELLANEOUS
Status: COMPLETED | OUTPATIENT
Start: 2024-12-13 | End: 2024-12-14

## 2024-12-13 RX ORDER — POLYETHYLENE GLYCOL 3350 17 G/17G
17 POWDER, FOR SOLUTION ORAL DAILY
Status: DISCONTINUED | OUTPATIENT
Start: 2024-12-13 | End: 2024-12-14 | Stop reason: HOSPADM

## 2024-12-13 RX ORDER — ENOXAPARIN SODIUM 100 MG/ML
40 INJECTION SUBCUTANEOUS EVERY 24 HOURS
Status: DISCONTINUED | OUTPATIENT
Start: 2024-12-13 | End: 2024-12-14 | Stop reason: HOSPADM

## 2024-12-13 RX ORDER — ACETAMINOPHEN 650 MG/1
650 SUPPOSITORY RECTAL EVERY 4 HOURS PRN
Status: DISCONTINUED | OUTPATIENT
Start: 2024-12-13 | End: 2024-12-14 | Stop reason: HOSPADM

## 2024-12-13 RX ORDER — GUAIFENESIN 600 MG/1
600 TABLET, EXTENDED RELEASE ORAL EVERY 12 HOURS PRN
Status: DISCONTINUED | OUTPATIENT
Start: 2024-12-13 | End: 2024-12-14 | Stop reason: HOSPADM

## 2024-12-13 RX ORDER — ACETAMINOPHEN 325 MG/1
650 TABLET ORAL EVERY 4 HOURS PRN
Status: DISCONTINUED | OUTPATIENT
Start: 2024-12-13 | End: 2024-12-14 | Stop reason: HOSPADM

## 2024-12-13 RX ORDER — GUAIFENESIN 600 MG/1
600 TABLET, EXTENDED RELEASE ORAL EVERY 12 HOURS PRN
Status: CANCELLED | OUTPATIENT
Start: 2024-12-12

## 2024-12-13 RX ORDER — FINASTERIDE 5 MG/1
5 TABLET, FILM COATED ORAL DAILY
Status: DISCONTINUED | OUTPATIENT
Start: 2024-12-13 | End: 2024-12-14 | Stop reason: HOSPADM

## 2024-12-13 RX ORDER — ONDANSETRON HYDROCHLORIDE 2 MG/ML
4 INJECTION, SOLUTION INTRAVENOUS EVERY 8 HOURS PRN
Status: DISCONTINUED | OUTPATIENT
Start: 2024-12-13 | End: 2024-12-14 | Stop reason: HOSPADM

## 2024-12-13 RX ORDER — TALC
3 POWDER (GRAM) TOPICAL NIGHTLY PRN
Status: DISCONTINUED | OUTPATIENT
Start: 2024-12-13 | End: 2024-12-14 | Stop reason: HOSPADM

## 2024-12-13 RX ORDER — ONDANSETRON 4 MG/1
4 TABLET, FILM COATED ORAL EVERY 8 HOURS PRN
Status: DISCONTINUED | OUTPATIENT
Start: 2024-12-13 | End: 2024-12-14 | Stop reason: HOSPADM

## 2024-12-13 RX ORDER — ATORVASTATIN CALCIUM 20 MG/1
20 TABLET, FILM COATED ORAL NIGHTLY
Status: DISCONTINUED | OUTPATIENT
Start: 2024-12-13 | End: 2024-12-14 | Stop reason: HOSPADM

## 2024-12-13 RX ORDER — ACETAMINOPHEN 160 MG/5ML
650 SOLUTION ORAL EVERY 4 HOURS PRN
Status: DISCONTINUED | OUTPATIENT
Start: 2024-12-13 | End: 2024-12-14 | Stop reason: HOSPADM

## 2024-12-13 RX ORDER — LISINOPRIL 5 MG/1
5 TABLET ORAL DAILY
Status: DISCONTINUED | OUTPATIENT
Start: 2024-12-13 | End: 2024-12-14 | Stop reason: HOSPADM

## 2024-12-13 RX ORDER — TAMSULOSIN HYDROCHLORIDE 0.4 MG/1
0.4 CAPSULE ORAL DAILY
Status: DISCONTINUED | OUTPATIENT
Start: 2024-12-13 | End: 2024-12-14 | Stop reason: HOSPADM

## 2024-12-13 ASSESSMENT — ACTIVITIES OF DAILY LIVING (ADL): LACK_OF_TRANSPORTATION: NO

## 2024-12-13 ASSESSMENT — COGNITIVE AND FUNCTIONAL STATUS - GENERAL
MOBILITY SCORE: 24
DAILY ACTIVITIY SCORE: 24
MOBILITY SCORE: 24
DAILY ACTIVITIY SCORE: 24

## 2024-12-13 ASSESSMENT — PAIN SCALES - WONG BAKER
WONGBAKER_NUMERICALRESPONSE: NO HURT

## 2024-12-13 ASSESSMENT — PAIN SCALES - PAIN ASSESSMENT IN ADVANCED DEMENTIA (PAINAD)
TOTALSCORE: 0
BODYLANGUAGE: RELAXED
BREATHING: NORMAL
CONSOLABILITY: NO NEED TO CONSOLE
FACIALEXPRESSION: SMILING OR INEXPRESSIVE

## 2024-12-13 ASSESSMENT — PAIN SCALES - GENERAL
PAINLEVEL_OUTOF10: 0 - NO PAIN
PAINLEVEL_OUTOF10: 8
PAINLEVEL_OUTOF10: 0 - NO PAIN
PAINLEVEL_OUTOF10: 0 - NO PAIN

## 2024-12-13 ASSESSMENT — PAIN - FUNCTIONAL ASSESSMENT: PAIN_FUNCTIONAL_ASSESSMENT: 0-10

## 2024-12-13 ASSESSMENT — PAIN DESCRIPTION - DESCRIPTORS: DESCRIPTORS: PRESSURE

## 2024-12-13 ASSESSMENT — PAIN INTENSITY VAS: VAS_PAIN_BASICVITALS_IP: 0

## 2024-12-13 NOTE — CARE PLAN
The clinical goals for the shift include remain free from injury    Problem: Pain - Adult  Goal: Verbalizes/displays adequate comfort level or baseline comfort level  Outcome: Progressing     Problem: Discharge Planning  Goal: Discharge to home or other facility with appropriate resources  Outcome: Progressing     Problem: Chronic Conditions and Co-morbidities  Goal: Patient's chronic conditions and co-morbidity symptoms are monitored and maintained or improved  Outcome: Progressing     Problem: Fall/Injury  Goal: Not fall by end of shift  Outcome: Progressing  Goal: Be free from injury by end of the shift  Outcome: Progressing  Goal: Use assistive devices by end of the shift  Outcome: Progressing  Goal: Pace activities to prevent fatigue by end of the shift  Outcome: Progressing

## 2024-12-13 NOTE — CONSULTS
"Inpatient consult to Neurology  Consult performed by: Butch Albert MD  Consult ordered by: Tobias Edwards MD          History Of Present Illness  Cristiano Duarte is a 79 y.o. right-handed male presenting with episode of altered speech.    He is evaluated in the [facility] observation unit this morning accompanied by his wife.    He has past medical history significant for hypertension with suboptimal control, hyperlipidemia, BPH, former cigarette smoking, hernia repair.  He has past neurologic history significant for an episode in March 2023 for which she was hospitalized at [place] and diagnosed with TIA versus possible seizure; per his wife this was a staring spell, and per review of the ED note from that presentation he was staring and verbally unresponsive for about 5 minutes, then confused afterward with slurred speech.  He was however back to baseline by the time of ED arrival.  Stroke workup was negative.  He was not definitively diagnosed with seizure/epilepsy and was not started on an anticonvulsant per the patient and his wife.    He presented to the [facility] ED yesterday.  Per his wife they were on a cruise in the [place] and he was diagnosed with pneumonia and removed from the cruise on medical grounds.  The patient himself indicates no symptoms that he recalls, but his wife indicates that he was short of breath.  He was apparently not coughing or febrile to their knowledge.    Prior to him being removed from the cruise, overnight from 12/7-12/8, he arose from bed to walk to the bathroom and his legs felt shaky and rubbery.  He nonetheless was able to stand and walk, but required balance assist from his wife.  When he spoke to her it was initially \"slurred\", briefly, and subsequently just very slow and deliberate speech, for about an hour.  He was able to use the bathroom and go back to bed.  Later on the morning of 12/8 he was evaluated by the ship's doctor and at that time medically removed " from the cruise.    He and his wife indicate no subsequent episodes of speech disturbance.  His wife does not recall him exhibiting lateralized facial or limb weakness at the time of the above event.  The patient himself is a very limited historian but denies headaches, dizziness, acute vision change, or other new neurological symptoms.  He does not recall many details of his 2023 presentation.    Although diagnosed with TIA in March 2023 he has not been taking aspirin or other antiplatelet agents, nor anticoagulation.    His wife indicates that his blood pressure has been significantly elevated recently.  Initial blood pressure on presentation here yesterday was 148/68 but it has climbed as high as 190/78 here.    There is no head imaging available for review.      Past Medical History  Past Medical History:   Diagnosis Date    Essential (primary) hypertension 12/08/2013    Benign essential hypertension     Surgical History  Past Surgical History:   Procedure Laterality Date    HERNIA REPAIR  01/14/2016    Hernia Repair     Social History  Social History     Tobacco Use    Smoking status: Former     Types: Cigarettes    Smokeless tobacco: Former     Allergies  Patient has no known allergies.  Medications Prior to Admission   Medication Sig Dispense Refill Last Dose/Taking    amLODIPine (Norvasc) 10 mg tablet Take 1 tablet (10 mg) by mouth once daily.   12/12/2024    atorvastatin (Lipitor) 20 mg tablet Take 1 tablet (20 mg) by mouth once daily at bedtime.   Unknown    finasteride (Proscar) 5 mg tablet Take 1 tablet (5 mg) by mouth once daily. Do not crush, chew, or split. 90 tablet 3 Unknown    lisinopril 5 mg tablet Take 1 tablet (5 mg) by mouth once daily.       metoprolol succinate XL (Toprol-XL) 50 mg 24 hr tablet Take 1 tablet (50 mg) by mouth once daily.          Review of Systems    Review of Systems:  Neurologic:  As per the history of present illness.  Constitutional:  Negative for fevers, chills.   Musculoskeletal:  Negative for neck pain. Cardiovascular:  Negative for chest pain or palpitations.  Respiratory: Positive for exertional dyspnea.  Eyes:  Negative for vision loss or diplopia.  ENT:  Negative for hearing loss or tinnitus.  GI:  Negative for bowel incontinence.  : Positive for difficulty with bladder emptying in context of BPH, apparently off medication.  Dermatologic:  Negative for rash.          Neurological Exam  Physical Exam    Physical Examination:    General: Alert, semirecumbent in bed in no acute distress.  Wife at bedside.    Mental Status: Clear sensorium without fluctuation.  Appropriate in conversation but very taciturn, volunteering nothing.  Oriented to self, date and day of the week, not city or other aspects of location; able to tell me that he lives in [place].  Recent and remote recall extremely vague and approximate for details of medical history.  Attention and concentration intact during interview.  Fund of knowledge fair for medical information.  Language intact and fluent without paraphasic errors, hesitancy, or slowed speech rate.    Cranial Nerves:  Funduscopic exam was not well visualized bilaterally on nondilated exam.  Pupils were equal, round and reactive to light with no relative afferent pupillary defect.  Extraocular movements were intact and conjugate.  Low amplitude right beating and torsional nystagmus on right gaze, not well appreciated in other positions of gaze.  No ptosis.  Visual fields were full to confrontation tested binocularly.  Facial sensation was symmetric to pin.  Facial motor function was symmetrically intact.  Hearing was grossly intact.  No dysarthria.  Shoulder shrug was symmetric.  Tongue protrusion was midline.    Motor: Muscle bulk and tone were normal throughout. There was no pronator drift or asymmetry of finger taps. Confrontation strength was symmetrically 5/5 throughout the upper and lower extremities.     Coordination: Finger to finger  "was accurate bilaterally without dysmetria or intention tremor.  No postural or rest tremor, myoclonus or dystonic posturing.    Tendon Reflexes: Symmetrically trace biceps and brachioradialis, absent patellar, neutral plantars.    Sensation: Pin and light touch were symmetric over the hands.    Station: Intact and stable.    Gait: Stable and unremarkable observed over a short distance without assistive devices.    Last Recorded Vitals  Blood pressure 125/65, pulse 62, temperature 36.3 °C (97.3 °F), temperature source Temporal, resp. rate 16, height 1.778 m (5' 10\"), weight 92.1 kg (203 lb), SpO2 95%.    Relevant Results                                     Assessment/Plan   Assessment & Plan      Recent episode possibly representing TIA or minor stroke, but indeterminate.  The episode occurred in the middle of the night and could have related to other factors such as hypotension, hypoglycemia or fatigue.  As described it was not particularly localizing, starting with dysarthria and a rubbery feeling of both legs and proceeding to slowed speech.     He does however have significant stroke risk factors including hypertension with suboptimal control, hyperlipidemia, former cigarette smoking.    Recommend:    Brain MRI with head/neck MRA    Continue aspirin (started in house) and atorvastatin.    Blood pressure control    He is on finasteride here but indicates difficulty with bladder emptying; may need urology input if this does not improve.        Butch Albert MD  "

## 2024-12-13 NOTE — PROGRESS NOTES
Pharmacy Medication History   Spoke with patient no longer taking Amlodipine deleted     Source of Information:     Additional concerns with the patient's PTA list.       The following updates were made to the Prior to Admission medication list:     Medications ADDED:   Terbinafine 250  Medications CHANGED:    Medications REMOVED:   Amlodipine 10mg  Medications NOT TAKING:       Allergy reviewed : Yes    Meds 2 Beds : Yes    The list below reflectives the updated PTA list. Please review each medication in order reconciliation for additional clarification and justification.    Prior to Admission Medications   Prescriptions Last Dose Informant   atorvastatin (Lipitor) 20 mg tablet 12/12/2024    Sig: Take 1 tablet (20 mg) by mouth once daily at bedtime.   finasteride (Proscar) 5 mg tablet 12/13/2024    Sig: Take 1 tablet (5 mg) by mouth once daily. Do not crush, chew, or split.   lisinopril 5 mg tablet 12/13/2024    Sig: Take 1 tablet (5 mg) by mouth once daily.   metoprolol succinate XL (Toprol-XL) 50 mg 24 hr tablet 12/12/2024    Sig: Take 1 tablet (50 mg) by mouth once daily.   terbinafine (LamISIL) 250 mg tablet     Sig: Take 1 tablet (250 mg) by mouth once daily. For Feet      Facility-Administered Medications: None       The list below reflectives the updated allergy list. Please review each documented allergy for additional clarification and justification.    No Known Allergies       12/13/24 at 2:49 PM - Chinyere Lujan

## 2024-12-13 NOTE — H&P
History Of Present Illness  Cristiano Duarte is a 79 y.o. male presenting with report of change in his speech, slurred, now resolved. He has history of BPH, hypertension Keppra hypercholesterolemia. Tells me that he and wife flew from [facility] to [place], [place] last week for cruise with departure last Saturday. Cruise was to [employer] (). Following am Day#1 on cruise  he was having some increased shortness of breath on exertion and feeling ill. Felt his legs were weak and unsteady. Wife states he was confused. Pt was seen by physician on ship while in port ([facility]) was ordered off ship to hospital in  due to medical illness.  Pt was admitted to hospital where wife reports told that he had pleural effusion and issues with urinary retention.  Patient also received Levaquin for presumed pneumonia.  Wife reports they did a CT head and CT PE which was negative.  Pt and wife tell me they signed out AMA from hospital as hospital reportedly wanted $3,500 for admission fee, an additional $3,500 for imaging and work up, and that they were subjected to additional fees due to on going work up and medical treatment. Upon leaving AMA pt and wife report that cruise ship and coordinated a seat on a commercial (non medical) flight from  to [place], [place]. Reported 10hr + layover due to inclement winter weather. They were flown from [place] to [place], [place]. Then took another flight from [place], [place] to [place], [place]. Pt came to [facility] for further evaluation, work up in ER noted. Pt labs largely stable. UA without infection. CTA chest without PE. Pt admitted for further care. Pt and wife endorse that pt is back to baseline no further issues with speech. Pt troubled by inability to pass urine on several occasions, has bladder volume of 187mL and feels uncomfortable unable to pass urine.          Past Medical History  He has a past medical history of Essential (primary) hypertension (12/08/2013).    Surgical History  He has  a past surgical history that includes Hernia repair (01/14/2016).     Social History  He reports that he has quit smoking. His smoking use included cigarettes. He has quit using smokeless tobacco. No history on file for alcohol use and drug use.    Family History  No family history on file.     Allergies  Patient has no known allergies.    Review of Systems   As above     Physical Exam  Constitutional:       Appearance: He is obese.   HENT:      Nose: Nose normal.      Mouth/Throat:      Mouth: Mucous membranes are moist.   Eyes:      Pupils: Pupils are equal, round, and reactive to light.   Cardiovascular:      Rate and Rhythm: Normal rate and regular rhythm.   Pulmonary:      Effort: Pulmonary effort is normal.   Abdominal:      Palpations: Abdomen is soft.   Musculoskeletal:         General: Normal range of motion.      Cervical back: Normal range of motion.   Skin:     General: Skin is dry.      Capillary Refill: Capillary refill takes less than 2 seconds.   Neurological:      Mental Status: He is alert and oriented to person, place, and time.   Psychiatric:         Mood and Affect: Mood normal.          Last Recorded Vitals  /65   Pulse 62   Temp 36.3 °C (97.3 °F) (Temporal)   Resp 16   Wt 92.1 kg (203 lb)   SpO2 95%     Relevant Results      No current facility-administered medications on file prior to encounter.     Current Outpatient Medications on File Prior to Encounter   Medication Sig Dispense Refill    amLODIPine (Norvasc) 10 mg tablet Take 1 tablet (10 mg) by mouth once daily.      atorvastatin (Lipitor) 20 mg tablet Take 1 tablet (20 mg) by mouth once daily at bedtime.      finasteride (Proscar) 5 mg tablet Take 1 tablet (5 mg) by mouth once daily. Do not crush, chew, or split. 90 tablet 3    lisinopril 5 mg tablet Take 1 tablet (5 mg) by mouth once daily.      metoprolol succinate XL (Toprol-XL) 50 mg 24 hr tablet Take 1 tablet (50 mg) by mouth once daily.         Results for orders placed  or performed during the hospital encounter of 12/12/24 (from the past 24 hours)   ECG 12 lead   Result Value Ref Range    Ventricular Rate 53 BPM    Atrial Rate 53 BPM    LA Interval 144 ms    QRS Duration 154 ms    QT Interval 470 ms    QTC Calculation(Bazett) 441 ms    P Axis 37 degrees    R Axis -63 degrees    T Axis -16 degrees    QRS Count 9 beats    Q Onset 205 ms    P Onset 133 ms    P Offset 193 ms    T Offset 440 ms    QTC Fredericia 451 ms   CBC and Auto Differential   Result Value Ref Range    WBC 8.4 4.4 - 11.3 x10*3/uL    nRBC 0.0 0.0 - 0.0 /100 WBCs    RBC 4.51 4.50 - 5.90 x10*6/uL    Hemoglobin 13.9 13.5 - 17.5 g/dL    Hematocrit 42.5 41.0 - 52.0 %    MCV 94 80 - 100 fL    MCH 30.8 26.0 - 34.0 pg    MCHC 32.7 32.0 - 36.0 g/dL    RDW 12.9 11.5 - 14.5 %    Platelets 183 150 - 450 x10*3/uL    Neutrophils % 65.6 40.0 - 80.0 %    Immature Granulocytes %, Automated 0.2 0.0 - 0.9 %    Lymphocytes % 21.0 13.0 - 44.0 %    Monocytes % 10.5 2.0 - 10.0 %    Eosinophils % 2.3 0.0 - 6.0 %    Basophils % 0.4 0.0 - 2.0 %    Neutrophils Absolute 5.52 (H) 1.60 - 5.50 x10*3/uL    Immature Granulocytes Absolute, Automated 0.02 0.00 - 0.50 x10*3/uL    Lymphocytes Absolute 1.76 0.80 - 3.00 x10*3/uL    Monocytes Absolute 0.88 (H) 0.05 - 0.80 x10*3/uL    Eosinophils Absolute 0.19 0.00 - 0.40 x10*3/uL    Basophils Absolute 0.03 0.00 - 0.10 x10*3/uL   Comprehensive metabolic panel   Result Value Ref Range    Glucose 88 74 - 99 mg/dL    Sodium 141 136 - 145 mmol/L    Potassium 4.7 3.5 - 5.3 mmol/L    Chloride 106 98 - 107 mmol/L    Bicarbonate 28 21 - 32 mmol/L    Anion Gap 12 10 - 20 mmol/L    Urea Nitrogen 26 (H) 6 - 23 mg/dL    Creatinine 0.89 0.50 - 1.30 mg/dL    eGFR 88 >60 mL/min/1.73m*2    Calcium 8.7 8.6 - 10.3 mg/dL    Albumin 3.7 3.4 - 5.0 g/dL    Alkaline Phosphatase 31 (L) 33 - 136 U/L    Total Protein 6.9 6.4 - 8.2 g/dL    AST 24 9 - 39 U/L    Bilirubin, Total 0.4 0.0 - 1.2 mg/dL    ALT 19 10 - 52 U/L   Magnesium    Result Value Ref Range    Magnesium 2.10 1.60 - 2.40 mg/dL   Troponin I, High Sensitivity, Initial   Result Value Ref Range    Troponin I, High Sensitivity 7 0 - 20 ng/L   Sars-CoV-2 and Influenza A/B PCR   Result Value Ref Range    Flu A Result Not Detected Not Detected    Flu B Result Not Detected Not Detected    Coronavirus 2019, PCR Not Detected Not Detected   RSV PCR   Result Value Ref Range    RSV PCR Not Detected Not Detected   D-dimer, quantitative   Result Value Ref Range    D-Dimer Non VTE, Quant (ng/mL FEU) 1,094 (H) <=500 ng/mL FEU   Troponin, High Sensitivity, 1 Hour   Result Value Ref Range    Troponin I, High Sensitivity 8 0 - 20 ng/L   Urinalysis with Reflex Culture and Microscopic   Result Value Ref Range    Color, Urine Light-Yellow Light-Yellow, Yellow, Dark-Yellow    Appearance, Urine Clear Clear    Specific Gravity, Urine >1.050 (N) 1.005 - 1.035    pH, Urine 6.0 5.0, 5.5, 6.0, 6.5, 7.0, 7.5, 8.0    Protein, Urine 20 (TRACE) NEGATIVE, 10 (TRACE), 20 (TRACE) mg/dL    Glucose, Urine Normal Normal mg/dL    Blood, Urine 0.2 (2+) (A) NEGATIVE    Ketones, Urine NEGATIVE NEGATIVE mg/dL    Bilirubin, Urine NEGATIVE NEGATIVE    Urobilinogen, Urine Normal Normal mg/dL    Nitrite, Urine NEGATIVE NEGATIVE    Leukocyte Esterase, Urine NEGATIVE NEGATIVE   Urinalysis Microscopic   Result Value Ref Range    WBC, Urine 6-10 (A) 1-5, NONE /HPF    RBC, Urine >20 (A) NONE, 1-2, 3-5 /HPF    Mucus, Urine FEW Reference range not established. /LPF   Basic metabolic panel   Result Value Ref Range    Glucose 110 (H) 74 - 99 mg/dL    Sodium 139 136 - 145 mmol/L    Potassium 4.2 3.5 - 5.3 mmol/L    Chloride 106 98 - 107 mmol/L    Bicarbonate 26 21 - 32 mmol/L    Anion Gap 11 10 - 20 mmol/L    Urea Nitrogen 21 6 - 23 mg/dL    Creatinine 0.89 0.50 - 1.30 mg/dL    eGFR 88 >60 mL/min/1.73m*2    Calcium 9.1 8.6 - 10.3 mg/dL   CBC   Result Value Ref Range    WBC 7.5 4.4 - 11.3 x10*3/uL    nRBC 0.0 0.0 - 0.0 /100 WBCs    RBC  4.76 4.50 - 5.90 x10*6/uL    Hemoglobin 14.5 13.5 - 17.5 g/dL    Hematocrit 43.7 41.0 - 52.0 %    MCV 92 80 - 100 fL    MCH 30.5 26.0 - 34.0 pg    MCHC 33.2 32.0 - 36.0 g/dL    RDW 12.6 11.5 - 14.5 %    Platelets 228 150 - 450 x10*3/uL               Assessment/Plan   Assessment & Plan  Shortness of breath    Benign essential hypertension    Benign prostatic hyperplasia    Acute urinary retention    Hyperlipidemia    TIA (transient ischemic attack)      Consult to Neurology, input noted  Await MRI/A brain and neck  Cont home regimen    Does have  urinary retention  Follows urology as out patient   On proscar  Add flomax  D/w nurse if unable to pass urine will need cohen  Likely dc w cohen and follow up with urology     -Continue current treatment as ordered. Will make adjustments as necessary.    VTE Prophylaxis  -See Orders    Plan of care discussed with: Provider, RN, Patient + wife    Patient case and plan of care discussed with Dr. PAULINE Edwards.    CHARY Rausch - CNP  -In collaboration with Dr. PAULINE Edwards    [facility]  Office: [phone]  Fax: [phone]  I have reviewed the above note obtained and documented by the NP/PA and I personally participated in the key components. I have discussed the case and management of the patient's care. Changes made to the note, and all key components of history and physical/progress note done by me.  MRI done results pendign   Urine retnetion cont with finasteroid and flomax  Dc plan with cohen   Follow up with urology sees Dr Argentina richards nursing  Tobias Edwards MD    .

## 2024-12-13 NOTE — PROGRESS NOTES
12/13/24 1308   Discharge Planning   Living Arrangements Spouse/significant other   Support Systems Spouse/significant other   Assistance Needed none   Type of Residence Private residence   Home or Post Acute Services None   Expected Discharge Disposition Home   Financial Resource Strain   How hard is it for you to pay for the very basics like food, housing, medical care, and heating? Not hard   Housing Stability   In the last 12 months, was there a time when you were not able to pay the mortgage or rent on time? N   At any time in the past 12 months, were you homeless or living in a shelter (including now)? N   Transportation Needs   In the past 12 months, has lack of transportation kept you from medical appointments or from getting medications? no   In the past 12 months, has lack of transportation kept you from meetings, work, or from getting things needed for daily living? No     Plan per Medical/Surgical team: neuro consult, possible urology consult, cohen, MRI head brain  Status: observation  Payor source: [employer]  Discharge disposition: Home--no needs identified at this time  Potential Barriers: further testing  ADOD: 12/14/24

## 2024-12-13 NOTE — CARE PLAN
The patient's goals for the shift include remain free from falls.     The clinical goals for the shift include remain free from injury

## 2024-12-14 VITALS
RESPIRATION RATE: 18 BRPM | BODY MASS INDEX: 29.06 KG/M2 | HEIGHT: 70 IN | DIASTOLIC BLOOD PRESSURE: 82 MMHG | OXYGEN SATURATION: 97 % | TEMPERATURE: 97.7 F | SYSTOLIC BLOOD PRESSURE: 153 MMHG | HEART RATE: 63 BPM | WEIGHT: 203 LBS

## 2024-12-14 LAB — BNP SERPL-MCNC: 20 PG/ML (ref 0–99)

## 2024-12-14 PROCEDURE — 2500000004 HC RX 250 GENERAL PHARMACY W/ HCPCS (ALT 636 FOR OP/ED): Performed by: FAMILY MEDICINE

## 2024-12-14 PROCEDURE — 2500000001 HC RX 250 WO HCPCS SELF ADMINISTERED DRUGS (ALT 637 FOR MEDICARE OP): Performed by: FAMILY MEDICINE

## 2024-12-14 PROCEDURE — 2500000002 HC RX 250 W HCPCS SELF ADMINISTERED DRUGS (ALT 637 FOR MEDICARE OP, ALT 636 FOR OP/ED): Performed by: NURSE PRACTITIONER

## 2024-12-14 PROCEDURE — G0378 HOSPITAL OBSERVATION PER HR: HCPCS

## 2024-12-14 RX ORDER — TAMSULOSIN HYDROCHLORIDE 0.4 MG/1
0.4 CAPSULE ORAL DAILY
Qty: 30 CAPSULE | Refills: 0 | Status: SHIPPED | OUTPATIENT
Start: 2024-12-15 | End: 2025-01-14

## 2024-12-14 ASSESSMENT — COGNITIVE AND FUNCTIONAL STATUS - GENERAL
MOBILITY SCORE: 24
DAILY ACTIVITIY SCORE: 24

## 2024-12-14 ASSESSMENT — PAIN SCALES - GENERAL: PAINLEVEL_OUTOF10: 0 - NO PAIN

## 2024-12-14 ASSESSMENT — PAIN - FUNCTIONAL ASSESSMENT: PAIN_FUNCTIONAL_ASSESSMENT: 0-10

## 2024-12-14 NOTE — CARE PLAN
The patient's goals for the shift include      The clinical goals for the shift include Pt will have adequate urine output throughout shift      Problem: Pain - Adult  Goal: Verbalizes/displays adequate comfort level or baseline comfort level  Outcome: Progressing     Problem: Discharge Planning  Goal: Discharge to home or other facility with appropriate resources  Outcome: Progressing     Problem: Chronic Conditions and Co-morbidities  Goal: Patient's chronic conditions and co-morbidity symptoms are monitored and maintained or improved  Outcome: Progressing     Problem: Fall/Injury  Goal: Not fall by end of shift  Outcome: Progressing  Goal: Be free from injury by end of the shift  Outcome: Progressing  Goal: Use assistive devices by end of the shift  Outcome: Progressing  Goal: Pace activities to prevent fatigue by end of the shift  Outcome: Progressing

## 2024-12-14 NOTE — CARE PLAN
The patient's goals for the shift include  void easier    The clinical goals for the shift include Remain HDS throughout the shift    Over the shift, the patient did not make progress toward the following goals. Barriers to progression include  Recommendations to address these barriers include      Problem: Pain - Adult  Goal: Verbalizes/displays adequate comfort level or baseline comfort level  Outcome: Progressing     Problem: Chronic Conditions and Co-morbidities  Goal: Patient's chronic conditions and co-morbidity symptoms are monitored and maintained or improved  Outcome: Progressing     Problem: Fall/Injury  Goal: Not fall by end of shift  Outcome: Progressing     Problem: Fall/Injury  Goal: Be free from injury by end of the shift  Outcome: Progressing     Problem: Discharge Planning  Goal: Discharge to home or other facility with appropriate resources  Outcome: Progressing

## 2024-12-14 NOTE — PROGRESS NOTES
12/14/24 1058   Discharge Planning   Expected Discharge Disposition Home         Patient is still  waiting for some test results. Neuro is following. When patient is medically ready will go home, no needs identified.

## 2024-12-14 NOTE — SIGNIFICANT EVENT
Brain MRI and head/neck MRA completed and reviewed.    Negative diffusion sequence.  Moderate burden of nonspecific appearing subcortical and periventricular white matter hyperintensities on FLAIR.  Cavum septum pellucidum.    No large vessel occlusion, high-grade stenosis or aneurysm on head/neck MRA.    Recommendations as per yesterday's note.  Signing off.  Reconsult as warranted.

## 2024-12-14 NOTE — NURSING NOTE
AVS discussed with pt and spouse at bedside. IV removed. Cohen teaching provided such as daily aishwarya care, record I/o, and follow up care. Pt discharged with cohen per provider note. Patient discharged to home.

## 2024-12-14 NOTE — DISCHARGE SUMMARY
Discharge Diagnosis  Shortness of breath    Issues Requiring Follow-Up  Will need follow up with urology for catheter care    Discharge Meds     Medication List      START taking these medications     tamsulosin 0.4 mg 24 hr capsule; Commonly known as: Flomax; Take 1   capsule (0.4 mg) by mouth once daily.; Start taking on: December 15, 2024     CONTINUE taking these medications     atorvastatin 20 mg tablet; Commonly known as: Lipitor   finasteride 5 mg tablet; Commonly known as: Proscar; Take 1 tablet (5   mg) by mouth once daily. Do not crush, chew, or split.   lisinopril 5 mg tablet   metoprolol succinate XL 50 mg 24 hr tablet; Commonly known as: Toprol-XL     STOP taking these medications     terbinafine 250 mg tablet; Commonly known as: LamISIL       Test Results Pending At Discharge  Pending Labs       Order Current Status    Extra Urine Gray Tube Collected (12/12/24 1804)    Urinalysis with Reflex Culture and Microscopic In process            Hospital Course   Admitted for not feeling well  Workup negative for pulmonary embolism or infection,   He did have urine retentino and failed voiding trial and will need followup with urology   Also he is going to go home with cohen catheter  MRI brain done to eval for tia due to slurred speech and it did not show any stroke    Pertinent Physical Exam At Time of Discharge  Physical Exam    Outpatient Follow-Up  No future appointments.      Tobias Edwards MD    Time > 30 min in discharge planning

## 2024-12-15 LAB
ATRIAL RATE: 53 BPM
P AXIS: 37 DEGREES
P OFFSET: 193 MS
P ONSET: 133 MS
PR INTERVAL: 144 MS
Q ONSET: 205 MS
QRS COUNT: 9 BEATS
QRS DURATION: 154 MS
QT INTERVAL: 470 MS
QTC CALCULATION(BAZETT): 441 MS
QTC FREDERICIA: 451 MS
R AXIS: -63 DEGREES
T AXIS: -16 DEGREES
T OFFSET: 440 MS
VENTRICULAR RATE: 53 BPM

## 2024-12-20 ENCOUNTER — OFFICE VISIT (OUTPATIENT)
Dept: UROLOGY | Facility: CLINIC | Age: 79
End: 2024-12-20
Payer: COMMERCIAL

## 2024-12-20 VITALS
HEIGHT: 70 IN | BODY MASS INDEX: 28.77 KG/M2 | SYSTOLIC BLOOD PRESSURE: 180 MMHG | WEIGHT: 201 LBS | DIASTOLIC BLOOD PRESSURE: 97 MMHG

## 2024-12-20 DIAGNOSIS — N40.0 BENIGN PROSTATIC HYPERPLASIA, UNSPECIFIED WHETHER LOWER URINARY TRACT SYMPTOMS PRESENT: Primary | ICD-10-CM

## 2024-12-20 DIAGNOSIS — R33.9 URINARY RETENTION: ICD-10-CM

## 2024-12-20 PROCEDURE — 99213 OFFICE O/P EST LOW 20 MIN: CPT | Performed by: UROLOGY

## 2024-12-20 PROCEDURE — 3077F SYST BP >= 140 MM HG: CPT | Performed by: UROLOGY

## 2024-12-20 PROCEDURE — 3080F DIAST BP >= 90 MM HG: CPT | Performed by: UROLOGY

## 2024-12-20 PROCEDURE — 1159F MED LIST DOCD IN RCRD: CPT | Performed by: UROLOGY

## 2024-12-20 NOTE — PROGRESS NOTES
12/20/2024  Patient developed urinary retention, Andrews catheter was inserted a week ago    Exam: Andrews catheter in place, urine concentrated yellow    Andrews catheter was removed without difficulty    We discussed benign prostate hypertrophy urinary retention  We discussed voiding trial today continue Flomax and finasteride  All the questions were answered, the patient expressed understanding and agreed to the plan.    Impression  BPH  Nocturia  Dysuria  PSA screening     Plan  Voiding trial today, call back at 2 PM  Flomax 0.4 mg daily  Proscar 5 mg daily  No more PSA screening  Follow-up PCP yearly  Call if problem    Chief Complaint   Patient presents with    cath     Pt here for follow up   BPH  Nocturia  Dysuria  PSA screening          Physical Exam     TODAYS LAB RESULTS:      ASSESSMENT&PLAN:      IMPRESSIONS:          07/12/2024  Voiding well no complain, nocturia x 2 does not bother     Patient has no nausea, no vomiting, no fever.     GIOVANI: Deferred     PSA: No more     We discussed benign prostate hypertrophy with mild voiding symptom.  Continue Proscar  We discussed no more PSA check due to the age  All the questions were answered, the patient expressed understanding and agreed to the plan.     Impression  BPH  Nocturia  Dysuria  PSA screening     Plan  Cut back liquid intake after 6 PM  Proscar 5 mg daily  No more PSA screening  Follow-up PCP yearly  Call if problem             Chief Complaint   Patient presents with    Benign Prostatic Hypertrophy       Patient is here today year check bph.         Physical Exam      TODAYS LAB RESULTS:           Lab Results   Component Value Date     PSA 2.72 01/23/2018      ASSESSMENT&PLAN:        IMPRESSIONS:       02/12/2024  Voiding frequently but stable on Proscar daily, nocturia 3-4 does not bother, does drink a lot of water     Patient has no nausea, no vomiting, no fever.     GIOVANI: Deferred     PSA: No more     We discussed benign prostate hypertrophy with mild  to moderate voiding symptoms of Proscar  We discussed cut back liquid intake during the day and at night  We discussed that no more PSA check  We discussed yearly GIOVANI with PCP  All the questions were answered, the patient expressed understanding and agreed to the plan.     Impression  BPH  Nocturia  Dysuria  PSA screening     Plan  Cut back liquid intake after 6 PM  Proscar 5 mg daily  No more PSA screening  Follow-up PCP yearly  Call if problem             Chief Complaint   Patient presents with    Benign Prostatic Hypertrophy    Acute Urinary Retention        Patient voiding 3-4 times at night maybe more.          Physical Exam      TODAYS LAB RESULTS:  POC Glucose, Urine  NEGATIVE mg/dl 100 (1+) Abnormal    POC Bilirubin, Urine  NEGATIVE NEGATIVE   POC Ketones, Urine  NEGATIVE mg/dl NEGATIVE   POC Specific Gravity, Urine  1.005 - 1.035 >=1.030   POC Blood, Urine  NEGATIVE NEGATIVE   POC PH, Urine  No Reference Range Established PH 5.5   POC Protein, Urine  NEGATIVE, 30 (1+) mg/dl NEGATIVE   POC Urobilinogen, Urine  0.2, 1.0 EU/DL 0.2   Poc Nitrite, Urine  NEGATIVE NEGATIVE   POC Leukocytes, Urine  NEGATIVE NEGATIVE         ASSESSMENT&PLAN:        IMPRESSIONS:      06/30/2023  77-year-old gentleman presents long history of dysuria, episode of postop retention, nocturia 4-6     Patient has no nausea, no vomiting, no fever.     Exam: Circumcised, normal penis normal testes bilaterally     GIOVANI: 1+, no nodule        New patient here for urinary retention Former patient of  Patient has frequency at night. During the day he is urinating fine. Patient gets 3-4 times a night. Patient denies hematuria, dysuria, he feels that he empties bladder well and he feels his stream is normal.     PVR: 15ml     Last PSA done 01/23/2018 2.72         IO Glucose - Urine Negative REQUIRED    IO Bilirubin Negative REQUIRED    IO Ketones Negative REQUIRED    IO Specific Gravity 1.025 REQUIRED    IO Blood Trace REQUIRED    IO pH 5.0  REQUIRED    IO Protein, Urine Negative REQUIRED    IO Urobilinogen Normal (0.2-1.0 mg/dl) REQUIRED    IO Nitrite, Urine Negative REQUIRED    IO Leukocytes Negative      We discussed the benign prostate hypertrophy with a moderate voiding symptoms, Flomax trial 0.4 mg daily, cut back liquid intake after 6 PM  We discussed the PSA screening, 1 more time  All the questions were answered, the patient expressed understanding and agreed to the plan.     Impression  BPH  Nocturia  Dysuria  PSA screening     Plan  Cut back liquid intake after 6 PM  Flomax 0.4 mg daily x30 days, call back for refill  PSA screening  Appointment in 1 year            PSA   7/7/2023 2.58  01/23/2018 2.72 plan

## 2025-02-06 ENCOUNTER — APPOINTMENT (OUTPATIENT)
Dept: VASCULAR MEDICINE | Facility: HOSPITAL | Age: 80
End: 2025-02-06
Payer: COMMERCIAL

## 2025-04-11 ENCOUNTER — APPOINTMENT (OUTPATIENT)
Dept: NEUROLOGY | Facility: CLINIC | Age: 80
End: 2025-04-11
Payer: COMMERCIAL

## 2025-04-11 VITALS
DIASTOLIC BLOOD PRESSURE: 73 MMHG | TEMPERATURE: 96.2 F | WEIGHT: 220 LBS | HEIGHT: 69 IN | SYSTOLIC BLOOD PRESSURE: 170 MMHG | HEART RATE: 51 BPM | BODY MASS INDEX: 32.58 KG/M2

## 2025-04-11 DIAGNOSIS — M79.661 PAIN IN BOTH LOWER LEGS: Primary | ICD-10-CM

## 2025-04-11 DIAGNOSIS — M79.662 PAIN IN BOTH LOWER LEGS: Primary | ICD-10-CM

## 2025-04-11 PROCEDURE — 3078F DIAST BP <80 MM HG: CPT | Performed by: PSYCHIATRY & NEUROLOGY

## 2025-04-11 PROCEDURE — 1036F TOBACCO NON-USER: CPT | Performed by: PSYCHIATRY & NEUROLOGY

## 2025-04-11 PROCEDURE — 1160F RVW MEDS BY RX/DR IN RCRD: CPT | Performed by: PSYCHIATRY & NEUROLOGY

## 2025-04-11 PROCEDURE — 99204 OFFICE O/P NEW MOD 45 MIN: CPT | Performed by: PSYCHIATRY & NEUROLOGY

## 2025-04-11 PROCEDURE — 3077F SYST BP >= 140 MM HG: CPT | Performed by: PSYCHIATRY & NEUROLOGY

## 2025-04-11 PROCEDURE — 1159F MED LIST DOCD IN RCRD: CPT | Performed by: PSYCHIATRY & NEUROLOGY

## 2025-04-11 RX ORDER — TAMSULOSIN HYDROCHLORIDE 0.4 MG/1
0.4 CAPSULE ORAL DAILY
COMMUNITY

## 2025-04-11 RX ORDER — FINASTERIDE 5 MG/1
1 TABLET, FILM COATED ORAL
COMMUNITY
Start: 2025-02-25

## 2025-04-11 RX ORDER — DOXAZOSIN 4 MG/1
1 TABLET ORAL NIGHTLY
COMMUNITY
Start: 2024-12-23 | End: 2025-06-21

## 2025-04-11 NOTE — PROGRESS NOTES
NEUROLOGY OUTPATIENT INITIAL VISIT NOTE    Assessment/Plan   Diagnoses and all orders for this visit:  Pain in both lower legs  -     Vascular US PVR without exercise; Future      IMPRESSION:  Bilateral pins and needles dysesthesias without objective evidence for neuropathy, plexopathy, radiculopathy, or myelopathy to explain it.  Possibilities include mild peripheral neuropathy, neurogenic claudication, and vascular claudication.    PLAN:  I plan EMG/NCS of both legs to rule out peripheral neuropathy, or subtle lumbar radiculopathy.  I ordered PVRs of both legs to rule out vasculopathy.   If these studies are negative, I will consider lumbar MRI.  I will see him again at the time of the EMG/NCS.        Ashvin Eisenberg Jr., M.D., FAAN     --------      Subjective     Cristiano Duarte is a 79 y.o. year old, right-handed male referred for sensory symptoms in the legs.  He bring a friend with him today.    HPI  Bilateral pins and needles parestehsias of the feet, which can radiate to the distl legs when he is walking.  Constantly presetnt but worse when walking.  He denies other focal neurological symptoms including dysarthria, dysphagia, diplopia, focal weakness, focal sensory change, ataxia, vertigo, or bowel/bladder incontinence, among others.    Review of Systems   All other systems reviewed and are negative.      Past Medical History:   Diagnosis Date    Essential (primary) hypertension 12/08/2013    Benign essential hypertension     Past Surgical History:   Procedure Laterality Date    HERNIA REPAIR  01/14/2016    Hernia Repair     Social History     Tobacco Use    Smoking status: Former     Types: Cigarettes    Smokeless tobacco: Former   Substance Use Topics    Alcohol use: Yes     Comment: Very little     family history is not on file.    Current Outpatient Medications:     doxazosin (Cardura) 4 mg tablet, Take 1 tablet (4 mg) by mouth once daily at bedtime., Disp: , Rfl:     finasteride (Proscar) 5 mg  "tablet, Take 1 tablet (5 mg) by mouth early in the morning.., Disp: , Rfl:     atorvastatin (Lipitor) 20 mg tablet, Take 1 tablet (20 mg) by mouth once daily at bedtime., Disp: , Rfl:     lisinopril 5 mg tablet, Take 1 tablet (5 mg) by mouth once daily., Disp: , Rfl:     metoprolol succinate XL (Toprol-XL) 50 mg 24 hr tablet, Take 1 tablet (50 mg) by mouth once daily., Disp: , Rfl:     tamsulosin (Flomax) 0.4 mg 24 hr capsule, Take 1 capsule (0.4 mg) by mouth once daily., Disp: , Rfl:   No Known Allergies    Objective     /73   Pulse 51   Temp 35.7 °C (96.2 °F)   Ht 1.74 m (5' 8.5\")   Wt 99.8 kg (220 lb)   BMI 32.96 kg/m²     CONSTITUTIONAL:  No acute distress    CARDIOVASCULAR:  Normal pulses in the distal legs, no edema of either arm or either leg.  No carotid bruits.    MENTAL STATUS:  Awake, alert, fully oriented to self, place, and time, with present short-term memory, good awareness of recent events, normal attention span, concentration, and fund of knowledge.    SPEECH AND LANGUAGE:  Can name and repeat, follows all commands, has no dysarthria    FUNDOSCOPIC:  No papilledema    CRANIAL NERVES:  II-Vision present, visual fields full to confrontational testing    III/IV/VI--EOMs are present in all directions.  Pupils are symmetrically reactive in dim light.  No ptosis.    V--Normal facial sensation.    VII--No facial asymmetry.    VIII--Hearing present to voice bilaterally.    IX/X--Symmetric soft palate rise.    XI--Normal trapezius power bilaterally.    XII--Tongue protrudes without deviation.    MOTOR:  Normal power, tone, and bulk in both arms and both legs.    SENSORY:  Normal pin sensation in both arms and both legs without distal-proximal gradient, asymmetry, or spinal sensory level.    COORDINATION:  Normal finger-to-nose and heel-to-shin testing in both arms and both legs.    REFLEXES are normal and symmetric at the biceps, triceps, brachioradialis, patella, and ankle.  The plantar responses " are flexor.    GAIT is normal, without steppage, ataxia, shuffling, or spasticity.        Ashvin Eisenberg Jr., M.D., FAAN

## 2025-04-23 ENCOUNTER — HOSPITAL ENCOUNTER (OUTPATIENT)
Dept: VASCULAR MEDICINE | Facility: CLINIC | Age: 80
Discharge: HOME | End: 2025-04-23
Payer: COMMERCIAL

## 2025-04-23 DIAGNOSIS — M79.662 PAIN IN BOTH LOWER LEGS: ICD-10-CM

## 2025-04-23 DIAGNOSIS — M79.661 PAIN IN BOTH LOWER LEGS: ICD-10-CM

## 2025-04-23 DIAGNOSIS — I73.9 PERIPHERAL VASCULAR DISEASE, UNSPECIFIED (CMS-HCC): ICD-10-CM

## 2025-04-23 PROCEDURE — 93923 UPR/LXTR ART STDY 3+ LVLS: CPT | Performed by: INTERNAL MEDICINE

## 2025-04-23 PROCEDURE — 93923 UPR/LXTR ART STDY 3+ LVLS: CPT

## 2025-04-28 DIAGNOSIS — M79.661 PAIN IN BOTH LOWER LEGS: Primary | ICD-10-CM

## 2025-04-28 DIAGNOSIS — M79.662 PAIN IN BOTH LOWER LEGS: Primary | ICD-10-CM

## 2025-05-02 ENCOUNTER — APPOINTMENT (OUTPATIENT)
Dept: NEUROLOGY | Facility: CLINIC | Age: 80
End: 2025-05-02
Payer: COMMERCIAL

## 2025-05-02 VITALS
HEART RATE: 58 BPM | WEIGHT: 220 LBS | DIASTOLIC BLOOD PRESSURE: 70 MMHG | HEIGHT: 69 IN | TEMPERATURE: 97.3 F | BODY MASS INDEX: 32.58 KG/M2 | SYSTOLIC BLOOD PRESSURE: 146 MMHG

## 2025-05-02 DIAGNOSIS — G60.9 IDIOPATHIC PERIPHERAL NEUROPATHY: Primary | ICD-10-CM

## 2025-05-02 PROCEDURE — 95886 MUSC TEST DONE W/N TEST COMP: CPT | Performed by: PSYCHIATRY & NEUROLOGY

## 2025-05-02 PROCEDURE — 95910 NRV CNDJ TEST 7-8 STUDIES: CPT | Performed by: PSYCHIATRY & NEUROLOGY

## 2025-05-02 RX ORDER — GABAPENTIN 300 MG/1
300 CAPSULE ORAL 3 TIMES DAILY
Qty: 30 CAPSULE | Refills: 3 | Status: SHIPPED | OUTPATIENT
Start: 2025-05-02 | End: 2025-08-30

## 2025-05-02 NOTE — LETTER
May 2, 2025     Tobias Edwards MD  [id number]  [address], [facility] [address]  [facility] [address]    Patient: Cristiano Duarte   YOB: 1945   Date of Visit: 5/2/2025       Dear Dr. Tobias Edwards MD:    Thank you for referring Cristiano Duarte to me for EMG/NCS. Below are my notes for this visit.  If you have questions, please do not hesitate to call me.you.       Sincerely,     Ashvin Eisenberg Jr., M.D., [place]   ______________________________________________________________________________________    PRELIMINARY EMG REPORT    This study is abnormal.  There is electrophysiological evidence for a sensorimotor peripheral neuropathy, with particular involvement of the peroneal nerve at the fibular head bilaterally.  There is no definite electrophysiological evidence for lumbosacral radiculopathy or lumbosacral plexopathy in either leg.    Ashvin Eisenberg Jr., M.D., [place]

## 2025-05-02 NOTE — PROGRESS NOTES
PRELIMINARY EMG REPORT    This study is abnormal.  There is electrophysiological evidence for a sensorimotor peripheral neuropathy, with particular involvement of the peroneal nerve at the fibular head bilaterally.  There is no definite electrophysiological evidence for lumbosacral radiculopathy or lumbosacral plexopathy in either leg.    Ashvin Eisenberg Jr., M.D., FAAN, FAAN[place]

## 2025-05-02 NOTE — PATIENT INSTRUCTIONS
Gabapentin:    1 at bedtime for three days, then  1 twice daily for three days, then  1 three times daily for two weeks, then if no change in the discomfort, then    1 in morning and lunch, 2 at bedtime for three days  2 in morning, 1 at lunch, 2 at bedtime for three days  2 three times daily until further notice.    Call or message in 4 weeks with progress

## 2025-05-03 ENCOUNTER — LAB (OUTPATIENT)
Dept: LAB | Facility: HOSPITAL | Age: 80
End: 2025-05-03
Payer: COMMERCIAL

## 2025-05-03 DIAGNOSIS — G60.9 HEREDITARY AND IDIOPATHIC NEUROPATHY, UNSPECIFIED: Primary | ICD-10-CM

## 2025-05-03 LAB — PROT SERPL-MCNC: 6.9 G/DL (ref 6.4–8.2)

## 2025-05-03 PROCEDURE — 84155 ASSAY OF PROTEIN SERUM: CPT

## 2025-05-04 LAB
ANA SER QL IF: NORMAL
ARSENIC BLD-MCNC: NORMAL UG/DL
CENTROMERE B AB SER-ACNC: NORMAL
DSDNA AB SER-ACNC: NORMAL [IU]/ML
ENA JO1 AB SER IA-ACNC: NORMAL
ENA RNP AB SER-ACNC: NORMAL
ENA SCL70 AB SER IA-ACNC: NORMAL
ENA SM AB SER IA-ACNC: NORMAL
ENA SM+RNP AB SER IA-ACNC: NORMAL
ENA SS-A AB SER IA-ACNC: NORMAL
ENA SS-B AB SER IA-ACNC: NORMAL
LEAD BLDV-MCNC: NORMAL UG/DL
MERCURY BLD-MCNC: NORMAL NG/ML
NUCLEOSOME AB SER IA-ACNC: NORMAL
RIBOSOMAL P AB SER-ACNC: NORMAL
VIT B12 SERPL-MCNC: 424 PG/ML (ref 200–1100)

## 2025-05-06 LAB
ANA SER QL IF: NEGATIVE
ARSENIC BLD-MCNC: <3 MCG/L
CENTROMERE B AB SER-ACNC: NORMAL AI
DSDNA AB SER-ACNC: 2 IU/ML
ENA JO1 AB SER IA-ACNC: NORMAL AI
ENA RNP AB SER-ACNC: NORMAL AI
ENA SCL70 AB SER IA-ACNC: NORMAL AI
ENA SM AB SER IA-ACNC: NORMAL AI
ENA SM+RNP AB SER IA-ACNC: NORMAL AI
ENA SS-A AB SER IA-ACNC: NORMAL AI
ENA SS-B AB SER IA-ACNC: NORMAL AI
LEAD BLDV-MCNC: <1 MCG/DL
MERCURY BLD-MCNC: <4 MCG/L
NUCLEOSOME AB SER IA-ACNC: NORMAL AI
RIBOSOMAL P AB SER-ACNC: NORMAL AI
VIT B12 SERPL-MCNC: 424 PG/ML (ref 200–1100)

## 2025-05-09 LAB
ALBUMIN: 4 G/DL (ref 3.4–5)
ALPHA 1 GLOBULIN: 0.2 G/DL (ref 0.2–0.6)
ALPHA 2 GLOBULIN: 0.8 G/DL (ref 0.4–1.1)
BETA GLOBULIN: 0.9 G/DL (ref 0.5–1.2)
GAMMA GLOBULIN: 1.1 G/DL (ref 0.5–1.4)
IMMUNOFIXATION COMMENT: ABNORMAL
M-PROTEIN 1: 0.1 G/DL (ref ?–0)
PATH REVIEW - SERUM IMMUNOFIXATION: ABNORMAL
PATH REVIEW-SERUM PROTEIN ELECTROPHORESIS: ABNORMAL
PROTEIN ELECTROPHORESIS COMMENT: ABNORMAL

## 2025-05-13 DIAGNOSIS — G60.9 IDIOPATHIC PERIPHERAL NEUROPATHY: Primary | ICD-10-CM

## 2025-05-19 ENCOUNTER — LAB (OUTPATIENT)
Dept: LAB | Facility: HOSPITAL | Age: 80
End: 2025-05-19
Payer: COMMERCIAL

## 2025-05-19 LAB
COLLECT DURATION TIME SPEC: 24 HRS
PROT 24H UR-MCNC: 6 MG/DL (ref 5–25)
SPECIMEN VOL 24H UR: 1850 ML
TOTAL PROTEIN (MG/24HR) IN 24 HOUR URINE UPE: 111 MG/24H

## 2025-05-19 PROCEDURE — 86335 IMMUNFIX E-PHORSIS/URINE/CSF: CPT

## 2025-05-19 PROCEDURE — 86325 OTHER IMMUNOELECTROPHORESIS: CPT

## 2025-05-19 PROCEDURE — 84156 ASSAY OF PROTEIN URINE: CPT

## 2025-05-19 PROCEDURE — 84166 PROTEIN E-PHORESIS/URINE/CSF: CPT

## 2025-05-19 PROCEDURE — 81050 URINALYSIS VOLUME MEASURE: CPT

## 2025-05-22 LAB
ALBUMIN MFR UR ELPH: 33.2 %
ALPHA1 GLOB MFR UR ELPH: 7.7 %
ALPHA2 GLOB MFR UR ELPH: 15.8 %
B-GLOBULIN MFR UR ELPH: 21.3 %
GAMMA GLOB MFR UR ELPH: 22 %
IMMUNOFIXATION COMMENT: NORMAL
PATH REVIEW - URINE IMMUNOFIXATION: NORMAL
PATH REVIEW-URINE PROTEIN ELECTROPHORESIS: NORMAL
URINE ELECTROPHORESIS COMMENT: NORMAL

## 2025-05-23 ENCOUNTER — LAB (OUTPATIENT)
Dept: LAB | Facility: HOSPITAL | Age: 80
End: 2025-05-23
Payer: COMMERCIAL

## 2025-05-23 ENCOUNTER — OFFICE VISIT (OUTPATIENT)
Dept: HEMATOLOGY/ONCOLOGY | Facility: HOSPITAL | Age: 80
End: 2025-05-23
Payer: COMMERCIAL

## 2025-05-23 VITALS
TEMPERATURE: 97.2 F | BODY MASS INDEX: 31.5 KG/M2 | DIASTOLIC BLOOD PRESSURE: 69 MMHG | HEART RATE: 63 BPM | RESPIRATION RATE: 16 BRPM | SYSTOLIC BLOOD PRESSURE: 149 MMHG | WEIGHT: 210.2 LBS

## 2025-05-23 DIAGNOSIS — D47.2 MGUS (MONOCLONAL GAMMOPATHY OF UNKNOWN SIGNIFICANCE): ICD-10-CM

## 2025-05-23 DIAGNOSIS — D47.2 MGUS (MONOCLONAL GAMMOPATHY OF UNKNOWN SIGNIFICANCE): Primary | ICD-10-CM

## 2025-05-23 LAB
ALBUMIN SERPL BCP-MCNC: 4.2 G/DL (ref 3.4–5)
ALP SERPL-CCNC: 39 U/L (ref 33–136)
ALT SERPL W P-5'-P-CCNC: 20 U/L (ref 10–52)
ANION GAP SERPL CALC-SCNC: 14 MMOL/L (ref 10–20)
AST SERPL W P-5'-P-CCNC: 18 U/L (ref 9–39)
B2 MICROGLOB SERPL-MCNC: 3 MG/L (ref 0.7–2.2)
BASOPHILS # BLD AUTO: 0.04 X10*3/UL (ref 0–0.1)
BASOPHILS NFR BLD AUTO: 0.5 %
BILIRUB SERPL-MCNC: 0.5 MG/DL (ref 0–1.2)
BUN SERPL-MCNC: 26 MG/DL (ref 6–23)
CALCIUM SERPL-MCNC: 9.7 MG/DL (ref 8.6–10.3)
CHLORIDE SERPL-SCNC: 104 MMOL/L (ref 98–107)
CO2 SERPL-SCNC: 30 MMOL/L (ref 21–32)
CREAT SERPL-MCNC: 0.98 MG/DL (ref 0.5–1.3)
EGFRCR SERPLBLD CKD-EPI 2021: 79 ML/MIN/1.73M*2
EOSINOPHIL # BLD AUTO: 0.24 X10*3/UL (ref 0–0.4)
EOSINOPHIL NFR BLD AUTO: 3 %
ERYTHROCYTE [DISTWIDTH] IN BLOOD BY AUTOMATED COUNT: 13 % (ref 11.5–14.5)
GLUCOSE SERPL-MCNC: 92 MG/DL (ref 74–99)
HCT VFR BLD AUTO: 43.4 % (ref 41–52)
HGB BLD-MCNC: 14.2 G/DL (ref 13.5–17.5)
IGA SERPL-MCNC: 299 MG/DL (ref 70–400)
IGG SERPL-MCNC: 1110 MG/DL (ref 700–1600)
IGM SERPL-MCNC: 92 MG/DL (ref 40–230)
IMM GRANULOCYTES # BLD AUTO: 0.02 X10*3/UL (ref 0–0.5)
IMM GRANULOCYTES NFR BLD AUTO: 0.3 % (ref 0–0.9)
LYMPHOCYTES # BLD AUTO: 1.93 X10*3/UL (ref 0.8–3)
LYMPHOCYTES NFR BLD AUTO: 24.2 %
MCH RBC QN AUTO: 31.3 PG (ref 26–34)
MCHC RBC AUTO-ENTMCNC: 32.7 G/DL (ref 32–36)
MCV RBC AUTO: 96 FL (ref 80–100)
MONOCYTES # BLD AUTO: 0.74 X10*3/UL (ref 0.05–0.8)
MONOCYTES NFR BLD AUTO: 9.3 %
NEUTROPHILS # BLD AUTO: 5.02 X10*3/UL (ref 1.6–5.5)
NEUTROPHILS NFR BLD AUTO: 62.7 %
NRBC BLD-RTO: 0 /100 WBCS (ref 0–0)
PLATELET # BLD AUTO: 206 X10*3/UL (ref 150–450)
POTASSIUM SERPL-SCNC: 4.1 MMOL/L (ref 3.5–5.3)
PROT SERPL-MCNC: 6.9 G/DL (ref 6.4–8.2)
PROT SERPL-MCNC: 7 G/DL (ref 6.4–8.2)
RBC # BLD AUTO: 4.54 X10*6/UL (ref 4.5–5.9)
SODIUM SERPL-SCNC: 144 MMOL/L (ref 136–145)
WBC # BLD AUTO: 8 X10*3/UL (ref 4.4–11.3)

## 2025-05-23 PROCEDURE — 83521 IG LIGHT CHAINS FREE EACH: CPT

## 2025-05-23 PROCEDURE — 85025 COMPLETE CBC W/AUTO DIFF WBC: CPT

## 2025-05-23 PROCEDURE — 3077F SYST BP >= 140 MM HG: CPT

## 2025-05-23 PROCEDURE — 1125F AMNT PAIN NOTED PAIN PRSNT: CPT

## 2025-05-23 PROCEDURE — 3078F DIAST BP <80 MM HG: CPT

## 2025-05-23 PROCEDURE — 80053 COMPREHEN METABOLIC PANEL: CPT

## 2025-05-23 PROCEDURE — 36415 COLL VENOUS BLD VENIPUNCTURE: CPT

## 2025-05-23 PROCEDURE — 86334 IMMUNOFIX E-PHORESIS SERUM: CPT

## 2025-05-23 PROCEDURE — 99205 OFFICE O/P NEW HI 60 MIN: CPT

## 2025-05-23 PROCEDURE — 99215 OFFICE O/P EST HI 40 MIN: CPT | Mod: 25

## 2025-05-23 PROCEDURE — 84155 ASSAY OF PROTEIN SERUM: CPT

## 2025-05-23 PROCEDURE — 82784 ASSAY IGA/IGD/IGG/IGM EACH: CPT

## 2025-05-23 PROCEDURE — 82232 ASSAY OF BETA-2 PROTEIN: CPT

## 2025-05-23 ASSESSMENT — ENCOUNTER SYMPTOMS
NEUROLOGICAL NEGATIVE: 1
RESPIRATORY NEGATIVE: 1
PSYCHIATRIC NEGATIVE: 1
CARDIOVASCULAR NEGATIVE: 1
CONSTITUTIONAL NEGATIVE: 1
GASTROINTESTINAL NEGATIVE: 1
MUSCULOSKELETAL NEGATIVE: 1
EYES NEGATIVE: 1
ENDOCRINE NEGATIVE: 1
HEMATOLOGIC/LYMPHATIC NEGATIVE: 1

## 2025-05-23 ASSESSMENT — PAIN SCALES - GENERAL: PAINLEVEL_OUTOF10: 5

## 2025-05-23 NOTE — PROGRESS NOTES
Patient ID: Cristiano Duarte is a 79 y.o. male.    Initial MGUS Workup   - SPEP: IgA Vignesh 0.1g/dl   - SFLC: Pending  - Immunoglobulins: Pending  - 24hr UPEP: no monoclonal proteins detected     -S: N/A  -Li: Pending  -M: N/A  -C: 9.1  -R: SrCr 0.98  -A: Hgb 14.2  -B: N/A  SUBJECTIVE   Patient presents for evaluation for MGUS. Was referred by neurologist at [facility] after abnormal EMG + detection of paraprotein on SPEP. Cristiano reports feeling well overall with exception of neuropathy in bilateral lower extremities. PMH includes HTN, HLD, BPH, hx TIA. Denies fevers, chills, n/v/d/c, night sweats, bony pain, unintentional weight loss, dizziness, syncope, chest pain.       Review of Systems   Constitutional: Negative.    HENT:  Negative.     Eyes: Negative.    Respiratory: Negative.     Cardiovascular: Negative.    Gastrointestinal: Negative.    Endocrine: Negative.    Genitourinary: Negative.     Musculoskeletal: Negative.    Skin: Negative.    Neurological: Negative.    Hematological: Negative.    Psychiatric/Behavioral: Negative.             OBJECTIVE      BSA: 2.15 meters squared  Wt 95.3 kg (210 lb 3.2 oz)   BMI 31.50 kg/m²     Physical Exam  Constitutional:       Appearance: Normal appearance.   HENT:      Nose: Nose normal.      Mouth/Throat:      Mouth: Mucous membranes are moist.      Pharynx: Oropharynx is clear.   Eyes:      Conjunctiva/sclera: Conjunctivae normal.      Pupils: Pupils are equal, round, and reactive to light.   Cardiovascular:      Rate and Rhythm: Normal rate and regular rhythm.      Pulses: Normal pulses.      Heart sounds: Normal heart sounds.   Pulmonary:      Effort: Pulmonary effort is normal.   Abdominal:      General: Abdomen is flat. Bowel sounds are normal.   Musculoskeletal:         General: Normal range of motion.   Skin:     General: Skin is warm.   Neurological:      General: No focal deficit present.      Mental Status: He is alert. Mental status is at baseline.   Psychiatric:     "     Mood and Affect: Mood normal.         Thought Content: Thought content normal.         Judgment: Judgment normal.         Performance Status:  Karnofsky Score: 100 - Fully active, able to carry on all pre-disease performed without restriction    MONITORING: MYELOMA LAB MARKERS  MARKERS RECENT LAB VALUES   Free Kappa Light Chains No results found for: \"KAPPA\"   Free Lambda Light Chains No results found for: \"LAMBDA\"   Free Light Chain Ratio No results found for: \"KAPLS\"    Immunofixation Lab Results   Component Value Date    IEPIN No monoclonal protein detected by immunofixation. 05/19/2025    IEPIN  05/03/2025     5/3/25  Monoclonal IgA lambda in the beta region at 0.1 g/dL.    Continued monitoring and correlation with additional testing is recommended to determine the significance of this band.             IgG No results found for: \"IGG\"   IgA No results found for: \"IGA\"   IgM No results found for: \"IGM\"     Other Labs  Lab Results   Component Value Date    WBC 7.5 12/13/2024    NRBC 0.0 12/13/2024    RBC 4.76 12/13/2024    HGB 14.5 12/13/2024    HCT 43.7 12/13/2024    MCV 92 12/13/2024    MCH 30.5 12/13/2024    MCHC 33.2 12/13/2024    RDW 12.6 12/13/2024     12/13/2024    IGPCT 0.2 12/12/2024    LYMPHOPCT 21.0 12/12/2024    MONOPCT 10.5 12/12/2024    EOSPCT 2.3 12/12/2024    BASOPCT 0.4 12/12/2024    NEUTROABS 5.52 (H) 12/12/2024    MONOSABS 0.88 (H) 12/12/2024    EOSABS 0.19 12/12/2024       Lab Results   Component Value Date    GLUCOSE 110 (H) 12/13/2024     12/13/2024    K 4.2 12/13/2024     12/13/2024    CO2 26 12/13/2024    ANIONGAP 11 12/13/2024    BUN 21 12/13/2024    CREATININE 0.89 12/13/2024    EGFR 88 12/13/2024    CALCIUM 9.1 12/13/2024    ALBUMIN 3.7 12/12/2024    ALKPHOS 31 (L) 12/12/2024    PROT 6.9 05/03/2025    AST 24 12/12/2024    BILITOT 0.4 12/12/2024    ALT 19 12/12/2024       No results found for: \"LDH\"    ASSESSMENT & PLAN   IgA Lambda MGUS  - labs today: CBC, CMP, " SPEP, UPEP, FLC, B2 microglobin, immunoglobulins  - M spike 0.1g/dl  - B sx: neuropathy  - no SLiM CRAB criteria  - BMBX ordered d/t IgA paraprotein + neuropathy  - RTC: 2 weeks after BMBX       Rachael Coffey, APRN-CNP

## 2025-05-24 LAB
KAPPA LC SERPL-MCNC: 3.79 MG/DL (ref 0.33–1.94)
KAPPA LC/LAMBDA SER: 1.55 {RATIO} (ref 0.26–1.65)
LAMBDA LC SERPL-MCNC: 2.45 MG/DL (ref 0.57–2.63)

## 2025-06-01 LAB
ALBUMIN: 4 G/DL (ref 3.4–5)
ALPHA 1 GLOBULIN: 0.2 G/DL (ref 0.2–0.6)
ALPHA 2 GLOBULIN: 0.7 G/DL (ref 0.4–1.1)
BETA GLOBULIN: 0.9 G/DL (ref 0.5–1.2)
GAMMA GLOBULIN: 1 G/DL (ref 0.5–1.4)
IMMUNOFIXATION COMMENT: ABNORMAL
M-PROTEIN 1: 0.1 G/DL (ref ?–0)
PATH REVIEW - SERUM IMMUNOFIXATION: ABNORMAL
PATH REVIEW-SERUM PROTEIN ELECTROPHORESIS: ABNORMAL
PROTEIN ELECTROPHORESIS COMMENT: ABNORMAL

## 2025-06-05 ENCOUNTER — OFFICE VISIT (OUTPATIENT)
Dept: CARDIOLOGY | Facility: HOSPITAL | Age: 80
End: 2025-06-05
Payer: COMMERCIAL

## 2025-06-05 VITALS
WEIGHT: 210 LBS | HEIGHT: 69 IN | SYSTOLIC BLOOD PRESSURE: 122 MMHG | DIASTOLIC BLOOD PRESSURE: 76 MMHG | BODY MASS INDEX: 31.1 KG/M2 | HEART RATE: 66 BPM | OXYGEN SATURATION: 96 %

## 2025-06-05 DIAGNOSIS — I10 BENIGN ESSENTIAL HYPERTENSION: ICD-10-CM

## 2025-06-05 DIAGNOSIS — I73.9 CLAUDICATION: ICD-10-CM

## 2025-06-05 DIAGNOSIS — R06.02 SHORTNESS OF BREATH: ICD-10-CM

## 2025-06-05 DIAGNOSIS — E78.5 HYPERLIPIDEMIA, UNSPECIFIED HYPERLIPIDEMIA TYPE: ICD-10-CM

## 2025-06-05 DIAGNOSIS — I70.213 INTERMITTENT CLAUDICATION OF BOTH LOWER EXTREMITIES DUE TO ATHEROSCLEROSIS: Primary | ICD-10-CM

## 2025-06-05 DIAGNOSIS — I73.9 PAD (PERIPHERAL ARTERY DISEASE): Primary | ICD-10-CM

## 2025-06-05 LAB
ATRIAL RATE: 65 BPM
P AXIS: 6 DEGREES
P OFFSET: 189 MS
P ONSET: 122 MS
PR INTERVAL: 158 MS
Q ONSET: 201 MS
QRS COUNT: 10 BEATS
QRS DURATION: 164 MS
QT INTERVAL: 446 MS
QTC CALCULATION(BAZETT): 463 MS
QTC FREDERICIA: 457 MS
R AXIS: -56 DEGREES
T AXIS: 4 DEGREES
T OFFSET: 424 MS
VENTRICULAR RATE: 65 BPM

## 2025-06-05 PROCEDURE — 93010 ELECTROCARDIOGRAM REPORT: CPT | Performed by: STUDENT IN AN ORGANIZED HEALTH CARE EDUCATION/TRAINING PROGRAM

## 2025-06-05 PROCEDURE — 99205 OFFICE O/P NEW HI 60 MIN: CPT | Performed by: STUDENT IN AN ORGANIZED HEALTH CARE EDUCATION/TRAINING PROGRAM

## 2025-06-05 PROCEDURE — 3074F SYST BP LT 130 MM HG: CPT | Performed by: STUDENT IN AN ORGANIZED HEALTH CARE EDUCATION/TRAINING PROGRAM

## 2025-06-05 PROCEDURE — 3078F DIAST BP <80 MM HG: CPT | Performed by: STUDENT IN AN ORGANIZED HEALTH CARE EDUCATION/TRAINING PROGRAM

## 2025-06-05 PROCEDURE — 93005 ELECTROCARDIOGRAM TRACING: CPT | Performed by: STUDENT IN AN ORGANIZED HEALTH CARE EDUCATION/TRAINING PROGRAM

## 2025-06-05 RX ORDER — CILOSTAZOL 50 MG/1
50 TABLET ORAL 2 TIMES DAILY
Qty: 60 TABLET | Refills: 11 | Status: SHIPPED | OUTPATIENT
Start: 2025-06-05 | End: 2026-06-05

## 2025-06-05 RX ORDER — NAPROXEN SODIUM 220 MG/1
81 TABLET, FILM COATED ORAL DAILY
Qty: 30 TABLET | Refills: 11 | Status: SHIPPED | OUTPATIENT
Start: 2025-06-05 | End: 2026-06-05

## 2025-06-05 NOTE — PROGRESS NOTES
[facility] Clinic Note    Date: 06/06/25  Time: 10:17 AM    Subjective   Cristiano Duarte is a 79 y.o. male who is coming to cardiac clinic for establishing care.  Patient has been referred to me by Dr. Tobias Edwards MD. Patient has history of bifascicular block, MGUS, hypertension, hyperlipidemia, TIA.  Patient is referred to me for evaluation of PAD.  Patient has been having claudication symptoms for few months.  He reports leg discomfort/heaviness/numbness with walking.  His activity is limited due to the same.  He can walk 30 steps before starting getting symptoms.  The symptoms are resolved with resting.  His CHI was performed which showed moderate disease bilaterally.    He denies any lower extremity wound/ulcer.    He denies any chest pain or dyspnea.      Smoking- past smoker, quit when he was 71 yrs ago   Alcohol- past   Illicit drug-denies  Family history-noncontributory    Review of Systems:  Otherwise, limited cardiovascular review of systems is negative.        Medical History:   He has a past medical history of Essential (primary) hypertension (12/08/2013).  Surgical History:   Surgical History[1]PSHP@  Social History:   Social Drivers of Health with Concerns     Tobacco Use: Medium Risk (6/5/2025)    Patient History     Smoking Tobacco Use: Former     Smokeless Tobacco Use: Former     Passive Exposure: Not on file   Physical Activity: Not on file   Stress: Not on file   Social Connections: Not on file   Digital Equity: Not on file   Health Literacy: Not on file     Family History:   Family History[2]   Allergies:  Patient has no known allergies.    Outpatient Medications:  Current Outpatient Medications   Medication Instructions    aspirin 81 mg, oral, Daily    atorvastatin (LIPITOR) 20 mg, Nightly    cilostazol (PLETAL) 50 mg, oral, 2 times daily    doxazosin (Cardura) 4 mg tablet 1 tablet, Nightly    finasteride (Proscar) 5 mg tablet 1 tablet, Daily (0630)     "gabapentin (NEURONTIN) 300 mg, oral, 3 times daily    lisinopril 5 mg, Daily RT    metoprolol succinate XL (TOPROL-XL) 50 mg, Daily RT    tamsulosin (FLOMAX) 0.4 mg, Daily       Objective     Physical Exam  Vitals:    06/05/25 1433   BP: 122/76   BP Location: Left arm   Patient Position: Sitting   BP Cuff Size: Adult   Pulse: 66   SpO2: 96%   Weight: 95.3 kg (210 lb)   Height: 1.74 m (5' 8.5\")     Wt Readings from Last 3 Encounters:   06/05/25 95.3 kg (210 lb)   05/23/25 95.3 kg (210 lb 3.2 oz)   05/02/25 99.8 kg (220 lb)       General: Alert and Oriented, No distress, cooperative  Head: Normocephalic without obvious abnormality, atraumatic  Eyes: Conjunctiva/corneas clear, EOM's grossly intact  Neck: Supple, trachea midline, No thyroid enlargement/tenderness/nodules; No JVD  Lungs: Clear to auscultation bilaterally, no wheezes, rhonci, or rales. respirations unlabored  Chest Wall: No tenderness or deformity  Heart: Regular rhythm, normal S1/S2, no murmur  Abdomen: Soft, non-tender, Non-distended, bowel sounds active  Extremities: No edema, no cyanosis, no clubbing  Skin: Skin color, texture, turgor normal.  No rashes or lesions noted  Neurologic: Alert and oriented x 3, grossly moving all extremities, speech intact        I have personally reviewed the following images and laboratory findings:  ECG: See scanned  Echocardiogram: not done  CONCLUSIONS:  Right Lower PVR: Evidence of moderate arterial occlusive disease in the right lower extremity at rest. Decreased digital perfusion noted. Monophasic flow is noted in the right posterior tibial artery and right dorsalis pedis artery. Multiphasic flow is noted in the right common femoral artery and right popliteal artery.  Left Lower PVR: Evidence of moderate arterial occlusive disease in the left lower extremity at rest. Decreased digital perfusion noted. Monophasic flow is noted in the left posterior tibial artery and left dorsalis pedis artery. Multiphasic flow is " noted in the left common femoral artery and left popliteal artery.     Imaging & Doppler Findings:     RIGHT Lower PVR                Pressures Ratios  Right High Thigh               255 mmHg  1.63  Right Low Thigh                128 mmHg  0.82  Right Calf                     98 mmHg   0.63  Right Posterior Tibial (Ankle) 76 mmHg   0.49  Right Dorsalis Pedis (Ankle)   105 mmHg  0.67  Right Digit (Great Toe)        35 mmHg   0.22           LEFT Lower PVR                Pressures Ratios  Left High Thigh               134 mmHg  0.86  Left Low Thigh                105 mmHg  0.67  Left Calf                     104 mmHg  0.67  Left Posterior Tibial (Ankle) 90 mmHg   0.58  Left Dorsalis Pedis (Ankle)   82 mmHg   0.53  Left Digit (Great Toe)        30 mmHg   0.19                           Right     Left  Brachial Pressure 155 mmHg 156 mmHg       Laboratory values:   Lab on 05/23/2025   Component Date Value    Beta-2 Microglobulin 05/23/2025 3.0 (H)     WBC 05/23/2025 8.0     nRBC 05/23/2025 0.0     RBC 05/23/2025 4.54     Hemoglobin 05/23/2025 14.2     Hematocrit 05/23/2025 43.4     MCV 05/23/2025 96     MCH 05/23/2025 31.3     MCHC 05/23/2025 32.7     RDW 05/23/2025 13.0     Platelets 05/23/2025 206     Neutrophils % 05/23/2025 62.7     Immature Granulocytes %,* 05/23/2025 0.3     Lymphocytes % 05/23/2025 24.2     Monocytes % 05/23/2025 9.3     Eosinophils % 05/23/2025 3.0     Basophils % 05/23/2025 0.5     Neutrophils Absolute 05/23/2025 5.02     Immature Granulocytes Ab* 05/23/2025 0.02     Lymphocytes Absolute 05/23/2025 1.93     Monocytes Absolute 05/23/2025 0.74     Eosinophils Absolute 05/23/2025 0.24     Basophils Absolute 05/23/2025 0.04     Glucose 05/23/2025 92     Sodium 05/23/2025 144     Potassium 05/23/2025 4.1     Chloride 05/23/2025 104     Bicarbonate 05/23/2025 30     Anion Gap 05/23/2025 14     Urea Nitrogen 05/23/2025 26 (H)     Creatinine 05/23/2025 0.98     eGFR 05/23/2025 79     Calcium 05/23/2025  9.7     Albumin 05/23/2025 4.2     Alkaline Phosphatase 05/23/2025 39     Total Protein 05/23/2025 7.0     AST 05/23/2025 18     Bilirubin, Total 05/23/2025 0.5     ALT 05/23/2025 20     Ig [place] Free Light Chain 05/23/2025 3.79 (H)     Ig Lambda Free Light [place]* 05/23/2025 2.45     Kappa/Lambda Ratio 05/23/2025 1.55     IgG 05/23/2025 1,110     IgA 05/23/2025 299     IgM 05/23/2025 92     Total Protein 05/23/2025 6.9     Albumin 05/23/2025 4.0     Alpha 1 Globulin 05/23/2025 0.2     Alpha 2 Globulin 05/23/2025 0.7     Beta Globulin 05/23/2025 0.9     Gamma 05/23/2025 1.0     M-PROTEIN 1 05/23/2025 0.1 (H)     Protein Electrophoresis * 05/23/2025 Aberrant band detected. See immunofixation.     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 Unchanged from the previous analysis on 5/3/25.     Path Review - Serum Prot* 05/23/2025 Reviewed and approved by JEFFREY CALDERON on 6/1/25 at 10:08 AM.         Path Review - Serum Immu* 05/23/2025 Reviewed and approved by JEFFREY CALDERON on 6/1/25 at 10:08 AM.        Orders Only on 05/13/2025   Component Date Value    Collection period 05/19/2025 24     Urine Volume 05/19/2025 1,850     Total Protein, Urine 05/19/2025 6     Total Protein,  24 Hour * 05/19/2025 111     Albumin % 05/19/2025 33.2     Alpha 1 Globulin % 05/19/2025 7.7     Alpha 2 Globulin % 05/19/2025 15.8     Beta Globulin % 05/19/2025 21.3     Gamma Globulin % 05/19/2025 22.0     Urine Electrophoresis Co* 05/19/2025 Normal.         Path Review-Urine Protei* 05/19/2025 Reviewed and approved by JEFFREY CALDERON on 5/22/25 at 4:12 PM.         Path Review - Urine Immu* 05/19/2025 Reviewed and approved by JEFFREY CALDERON on 5/22/25 at 4:12 PM.         Immunofixation Comment 05/19/2025 No monoclonal protein detected by immunofixation.    Procedure Visit on 05/02/2025   Component Date Value    VITAMIN B12 05/03/2025 424     RIBOSOMAL P ANTIBODY 05/03/2025 <1.0 NEG     TRES  SCREEN, IFA 05/03/2025 NEGATIVE     DNA (DS) ANTIBODY 05/03/2025 2     SCL-70 ANTIBODY 05/03/2025 <1.0 NEG     BARRY-1 ANTIBODY 05/03/2025 <1.0 NEG     CENTROMERE B ANTIBODY 05/03/2025 <1.0 NEG     RNP ANTIBODY 05/03/2025 <1.0 NEG     CHROMATIN (NUCLEOSOMAL) * 05/03/2025 <1.0 NEG     SM ANTIBODY 05/03/2025 <1.0 NEG     SM/RNP ANTIBODY 05/03/2025 <1.0 NEG     SJOGREN'S ANTIBODY (SS-A) 05/03/2025 <1.0 NEG     SJOGREN'S ANTIBODY (SS-B) 05/03/2025 <1.0 NEG     ARSENIC, BLOOD 05/03/2025 <3     MERCURY, BLOOD 05/03/2025 <4     LEAD (VENOUS) 05/03/2025 <1.0     Total Protein 05/03/2025 6.9     Albumin 05/03/2025 4.0     Alpha 1 Globulin 05/03/2025 0.2     Alpha 2 Globulin 05/03/2025 0.8     Beta Globulin 05/03/2025 0.9     Gamma 05/03/2025 1.1     M-PROTEIN 1 05/03/2025 0.1 (H)     Protein Electrophoresis * 05/03/2025 Aberrant band detected. See immunofixation.     Immunofixation Comment 05/03/2025 5/3/25  Monoclonal IgA lambda in the beta region at 0.1 g/dL.    Continued monitoring and correlation with additional testing is recommended to determine the significance of this band.            Path Review - Serum Prot* 05/03/2025 Reviewed and approved by JEFFREY CALDERNO on 5/9/25 at 10:13 AM.         Path Review - Serum Immu* 05/03/2025 Reviewed and approved by JEFFREY CALDERON on 5/9/25 at 10:13 AM.          CBC -  Lab Results   Component Value Date    WBC 8.0 05/23/2025    HGB 14.2 05/23/2025    HCT 43.4 05/23/2025    MCV 96 05/23/2025     05/23/2025       CMP -  Lab Results   Component Value Date    CALCIUM 9.7 05/23/2025    PHOS 4.5 03/29/2024    PROT 7.0 05/23/2025    PROT 6.9 05/23/2025    ALBUMIN 4.2 05/23/2025    AST 18 05/23/2025    ALT 20 05/23/2025    ALKPHOS 39 05/23/2025    BILITOT 0.5 05/23/2025       LIPID PANEL -   Lab Results   Component Value Date    CHOL 143 03/29/2024    HDL 38.8 03/29/2024    CHHDL 3.7 03/29/2024    VLDL 41 (H) 03/29/2024    TRIG 203 (H) 03/29/2024    NHDL 104 03/29/2024       RENAL  FUNCTION PANEL -   Lab Results   Component Value Date    K 4.1 05/23/2025    PHOS 4.5 03/29/2024       Lab Results   Component Value Date    BNP 20 12/12/2024    HGBA1C 6.2 (H) 08/02/2024        Assessment/Plan   PAD/claudication  Abnormal EKG/bifascicular block  Essential hypertension  Hyperlipidemia    Plan:  -I will obtain CTA with aortobifemoral runoff  -I will start on cilostazol 50 mg twice daily.  -I will refer to vascular rehab for supervised exercise program.  -I will start on aspirin 81 mg daily.  -Patient already on statin therapy I will continue.  I will check lipid panel.  -I will check hemoglobin A1c for risk stratification.  - Patient on metoprolol and lisinopril.  Blood pressure at goal.  Okay to continue    Follow-up after testing.    In addition, the following orders were placed today:  Orders Placed This Encounter   Procedures    CT angio aorta and bilateral iliofemoral runoff including without contrast if performed    Lipid panel    Hemoglobin A1C    Referral to Vascular Rehab    ECG 12 Lead                 SIGNATURE: Rolo Ewing MD PATIENT NAME: Cristiano Duarte   DATE/TIME: June 6, 2025 10:17 AM MRN: [id number]                                  [1]   Past Surgical History:  Procedure Laterality Date    HERNIA REPAIR  01/14/2016    Hernia Repair   [2] No family history on file.

## 2025-06-06 ENCOUNTER — APPOINTMENT (OUTPATIENT)
Dept: CARDIOLOGY | Facility: CLINIC | Age: 80
End: 2025-06-06
Payer: COMMERCIAL

## 2025-06-10 ENCOUNTER — TELEPHONE (OUTPATIENT)
Dept: CARDIOLOGY | Facility: HOSPITAL | Age: 80
End: 2025-06-10
Payer: COMMERCIAL

## 2025-06-10 NOTE — TELEPHONE ENCOUNTER
6/10/25  1340  Son of patient called in to report patient ended up having a low blood pressure of 93/58 and reported just not feeling right after taking the new medication Pletal.    When asked, son did report the patient took the Pletal along with his metoprolol and lisinopril that day but on prior days did not.    Recommended to son for patient to stagger the lisinopril, metoprolol and Pletal for avoid low blood pressures and see if that will help. Also asked son to call back if it does not help.    Son verbalized understanding.

## 2025-06-11 ENCOUNTER — PROCEDURE VISIT (OUTPATIENT)
Dept: HEMATOLOGY/ONCOLOGY | Facility: HOSPITAL | Age: 80
End: 2025-06-11
Payer: COMMERCIAL

## 2025-06-11 ENCOUNTER — LAB (OUTPATIENT)
Dept: LAB | Facility: HOSPITAL | Age: 80
End: 2025-06-11
Payer: COMMERCIAL

## 2025-06-11 VITALS
DIASTOLIC BLOOD PRESSURE: 82 MMHG | TEMPERATURE: 98.1 F | OXYGEN SATURATION: 100 % | RESPIRATION RATE: 18 BRPM | WEIGHT: 212.96 LBS | HEART RATE: 63 BPM | SYSTOLIC BLOOD PRESSURE: 161 MMHG | BODY MASS INDEX: 31.91 KG/M2

## 2025-06-11 DIAGNOSIS — D47.2 MGUS (MONOCLONAL GAMMOPATHY OF UNKNOWN SIGNIFICANCE): Primary | ICD-10-CM

## 2025-06-11 DIAGNOSIS — D47.2 MGUS (MONOCLONAL GAMMOPATHY OF UNKNOWN SIGNIFICANCE): ICD-10-CM

## 2025-06-11 LAB
BASOPHILS # BLD AUTO: 0.04 X10*3/UL (ref 0–0.1)
BASOPHILS NFR BLD AUTO: 0.5 %
CHOLEST SERPL-MCNC: 151 MG/DL (ref 0–199)
CHOLESTEROL/HDL RATIO: 3.8
EOSINOPHIL # BLD AUTO: 0.22 X10*3/UL (ref 0–0.4)
EOSINOPHIL NFR BLD AUTO: 2.7 %
ERYTHROCYTE [DISTWIDTH] IN BLOOD BY AUTOMATED COUNT: 12.2 % (ref 11.5–14.5)
EST. AVERAGE GLUCOSE BLD GHB EST-MCNC: 128 MG/DL
HBA1C MFR BLD: 6.1 % (ref ?–5.7)
HCT VFR BLD AUTO: 40.3 % (ref 41–52)
HDLC SERPL-MCNC: 39.6 MG/DL
HGB BLD-MCNC: 13.4 G/DL (ref 13.5–17.5)
IMM GRANULOCYTES # BLD AUTO: 0.02 X10*3/UL (ref 0–0.5)
IMM GRANULOCYTES NFR BLD AUTO: 0.2 % (ref 0–0.9)
LDLC SERPL CALC-MCNC: 67 MG/DL
LYMPHOCYTES # BLD AUTO: 2.1 X10*3/UL (ref 0.8–3)
LYMPHOCYTES NFR BLD AUTO: 25.9 %
MCH RBC QN AUTO: 31.2 PG (ref 26–34)
MCHC RBC AUTO-ENTMCNC: 33.3 G/DL (ref 32–36)
MCV RBC AUTO: 94 FL (ref 80–100)
MONOCYTES # BLD AUTO: 0.67 X10*3/UL (ref 0.05–0.8)
MONOCYTES NFR BLD AUTO: 8.3 %
NEUTROPHILS # BLD AUTO: 5.06 X10*3/UL (ref 1.6–5.5)
NEUTROPHILS NFR BLD AUTO: 62.4 %
NON HDL CHOLESTEROL: 111 MG/DL (ref 0–149)
NRBC BLD-RTO: 0 /100 WBCS (ref 0–0)
PLATELET # BLD AUTO: 187 X10*3/UL (ref 150–450)
RBC # BLD AUTO: 4.3 X10*6/UL (ref 4.5–5.9)
TRIGL SERPL-MCNC: 221 MG/DL (ref 0–149)
VLDL: 44 MG/DL (ref 0–40)
WBC # BLD AUTO: 8.1 X10*3/UL (ref 4.4–11.3)

## 2025-06-11 PROCEDURE — 38222 DX BONE MARROW BX & ASPIR: CPT | Mod: RT | Performed by: NURSE PRACTITIONER

## 2025-06-11 PROCEDURE — 38222 DX BONE MARROW BX & ASPIR: CPT | Performed by: NURSE PRACTITIONER

## 2025-06-11 PROCEDURE — 80061 LIPID PANEL: CPT | Performed by: STUDENT IN AN ORGANIZED HEALTH CARE EDUCATION/TRAINING PROGRAM

## 2025-06-11 PROCEDURE — 85025 COMPLETE CBC W/AUTO DIFF WBC: CPT

## 2025-06-11 PROCEDURE — 83036 HEMOGLOBIN GLYCOSYLATED A1C: CPT | Performed by: STUDENT IN AN ORGANIZED HEALTH CARE EDUCATION/TRAINING PROGRAM

## 2025-06-11 PROCEDURE — 85097 BONE MARROW INTERPRETATION: CPT

## 2025-06-11 PROCEDURE — 88237 TISSUE CULTURE BONE MARROW: CPT

## 2025-06-11 NOTE — PROGRESS NOTES
Bone Marrow Biopsy and Aspiration Procedure Note     Informed consent was obtained and potential risks including bleeding, infection and pain were reviewed with the patient.       The Right posterior iliac crest was prepped with chlorhexidine. Pt was consented for L side; side change d/t positioning    7 ml of lidocaine 1% local anesthesia infiltrated into the subcutaneous tissue.    Right bone marrow biopsy and right bone marrow aspirate was obtained.     The procedure was tolerated well and there were no complications.    Specimens sent for: routine histopathologic stains and sectioning, flow cytometry, cytogenetics, and molecular analysis    Procedure completed by: CHARY Chase-CNP

## 2025-06-12 ENCOUNTER — TELEPHONE (OUTPATIENT)
Dept: CARDIOLOGY | Facility: HOSPITAL | Age: 80
End: 2025-06-12
Payer: COMMERCIAL

## 2025-06-12 NOTE — TELEPHONE ENCOUNTER
6/13/25  0940  Called results and follow up directive to patient with patient verbalizing understanding.    Also gave suggestions for bringing down triglyceride levels which patient also verbalized understanding.        6/12/25  1111  Called patient; no answer. Left voice message for patient to return call for results and directives from Dr. Ewing.    ----- Message from Rolo Ewing sent at 6/11/2025  4:23 PM EDT -----  Some abnormalities, routine f/up with me   ----- Message -----  From: Lab, Background User  Sent: 6/11/2025  12:34 PM EDT  To: Rolo Ewing MD

## 2025-06-16 LAB
CELL COUNT (BLOOD): 11.35 X10*3/UL
CELL POPULATIONS: NORMAL
DIAGNOSIS: NORMAL
FLOW DIFFERENTIAL: NORMAL
FLOW TEST ORDERED: NORMAL
LAB TEST METHOD: NORMAL
NUMBER OF CELLS COLLECTED: NORMAL PER TUBE
PATH REPORT.COMMENTS IMP SPEC: NORMAL
PATH REPORT.FINAL DX SPEC: NORMAL
PATH REPORT.GROSS SPEC: NORMAL
PATH REPORT.MICROSCOPIC SPEC OTHER STN: NORMAL
PATH REPORT.RELEVANT HX SPEC: NORMAL
PATH REPORT.TOTAL CANCER: NORMAL
PATH REPORT.TOTAL CANCER: NORMAL
SIGNATURE COMMENT: NORMAL
SPECIMEN VIABILITY: NORMAL

## 2025-06-16 PROCEDURE — 88364 INSITU HYBRIDIZATION (FISH): CPT | Performed by: PATHOLOGY

## 2025-06-16 PROCEDURE — 85097 BONE MARROW INTERPRETATION: CPT | Performed by: PATHOLOGY

## 2025-06-16 PROCEDURE — 88305 TISSUE EXAM BY PATHOLOGIST: CPT | Performed by: PATHOLOGY

## 2025-06-16 PROCEDURE — 88364 INSITU HYBRIDIZATION (FISH): CPT | Mod: TC,SUR

## 2025-06-16 PROCEDURE — 88189 FLOWCYTOMETRY/READ 16 & >: CPT

## 2025-06-16 PROCEDURE — 88365 INSITU HYBRIDIZATION (FISH): CPT | Performed by: PATHOLOGY

## 2025-06-16 PROCEDURE — 88185 FLOWCYTOMETRY/TC ADD-ON: CPT | Mod: TC

## 2025-06-16 PROCEDURE — 88341 IMHCHEM/IMCYTCHM EA ADD ANTB: CPT | Performed by: PATHOLOGY

## 2025-06-16 PROCEDURE — 88342 IMHCHEM/IMCYTCHM 1ST ANTB: CPT | Performed by: PATHOLOGY

## 2025-06-16 PROCEDURE — 88342 IMHCHEM/IMCYTCHM 1ST ANTB: CPT | Mod: TC,59,SUR

## 2025-06-16 PROCEDURE — 88311 DECALCIFY TISSUE: CPT | Performed by: PATHOLOGY

## 2025-06-16 PROCEDURE — 88313 SPECIAL STAINS GROUP 2: CPT | Performed by: PATHOLOGY

## 2025-06-19 ENCOUNTER — TELEMEDICINE (OUTPATIENT)
Dept: HEMATOLOGY/ONCOLOGY | Facility: HOSPITAL | Age: 80
End: 2025-06-19
Payer: COMMERCIAL

## 2025-06-19 DIAGNOSIS — D47.2 MGUS (MONOCLONAL GAMMOPATHY OF UNKNOWN SIGNIFICANCE): Primary | ICD-10-CM

## 2025-06-19 PROCEDURE — 99215 OFFICE O/P EST HI 40 MIN: CPT

## 2025-06-19 ASSESSMENT — ENCOUNTER SYMPTOMS
CARDIOVASCULAR NEGATIVE: 1
ENDOCRINE NEGATIVE: 1
HEMATOLOGIC/LYMPHATIC NEGATIVE: 1
PSYCHIATRIC NEGATIVE: 1
GASTROINTESTINAL NEGATIVE: 1
MUSCULOSKELETAL NEGATIVE: 1
EYES NEGATIVE: 1
RESPIRATORY NEGATIVE: 1
CONSTITUTIONAL NEGATIVE: 1
NEUROLOGICAL NEGATIVE: 1

## 2025-06-19 NOTE — PROGRESS NOTES
Patient ID: Cristiano Duarte is a 79 y.o. male.    Initial MGUS Workup   - SPEP: IgA Vignesh 0.1g/dl   - SFLC: K 3.79, L 2.45, R 1.55  - Immunoglobulins: unremarkable   - 24hr UPEP: no monoclonal proteins detected     -S: N/A  -Li: K 3.79, L 2.45, R 1.55  -M: N/A  -C: 9.1  -R: SrCr 0.98  -A: Hgb 14.2  -B: N/A    BMBX 6/11/25  FINAL DIAGNOSIS   A, B & C. BONE MARROW CLOT WITH ASPIRATE AND CORE WITH TOUCH PREP, RIGHT ILIAC CREST:       --WITH LAMBDA RESTRICTED CLONAL PLASMA CELLS IN A POLYCLONAL BACKGROUND   --WITH FLOW CYTOMETRY SHOWING KAPPA RESTRICTED CLONAL CD5+ B-CELLS  --VARIABLY CELLULAR BONE MARROW (<10-50% CELLULAR) OVERALL AVERAGE NORMOCELLULAR MARROW 30% CELLULAR MARROW  --MATURING TRILINEAGE HEMATOPOIESIS   --SEE NOTE     Note: Patient's history of IgA lambda 0.1g/dl paraprotein is noted. Concurrent flow cytometry shows a lambda restricted plasma cells in a polyclonal background. The bone marrow biopsy, kappa / lambda IHC shows lambda restricted plasma cells in a polyclonal background. It is difficult to enumerate the number of clonal plasma cells since it is in a polyclonal background.  highlights 5-7% plasma cells in the marrow elements.   Flow cytometry also shows 0.3% of cells being B-cells that are kappa restricted and CD5 positive. 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 By IHC the lymphoid aggregates present show CD20 positive aggregates however co-expression of CD5 is difficult to interpret due to many T-cells being present. The CD20 positive aggregates represent, 2-3% of marrow elements. Overall, findings support the above diagnosis. Clinical and molecular results correlation is suggested.     SUBJECTIVE   /Patient presents for evaluation for MGUS. Was referred by neurologist at [facility] after abnormal EMG + detection of paraprotein on SPEP. Cristiano reports feeling well overall with exception of neuropathy in bilateral lower extremities.  PMH includes HTN, HLD, BPH, hx TIA. Denies fevers, chills, n/v/d/c, night sweats, bony pain, unintentional weight loss, dizziness, syncope, chest pain.     6/19/25: presents virtually to discuss results of bmbx. No new issues or concerns. All questions answered at this time      Review of Systems   Constitutional: Negative.    HENT:  Negative.     Eyes: Negative.    Respiratory: Negative.     Cardiovascular: Negative.    Gastrointestinal: Negative.    Endocrine: Negative.    Genitourinary: Negative.     Musculoskeletal: Negative.    Skin: Negative.    Neurological: Negative.    Hematological: Negative.    Psychiatric/Behavioral: Negative.             OBJECTIVE      BSA: There is no height or weight on file to calculate BSA.  There were no vitals taken for this visit.    Physical Exam    Performance Status:  Karnofsky Score: 100 - Fully active, able to carry on all pre-disease performed without restriction    MONITORING: MYELOMA LAB MARKERS  MARKERS RECENT LAB VALUES   Free Kappa Light Chains Lab Results   Component Value Date    KAPPA 3.79 (H) 05/23/2025      Free Lambda Light Chains Lab Results   Component Value Date    LAMBDA 2.45 05/23/2025      Free Light Chain Ratio Lab Results   Component Value Date    KAPLS 1.55 05/23/2025       Immunofixation Lab Results   Component Value Date    IEPIN  05/23/2025 5/23/25 Known monoclonal IgA lambda in the beta region at 0.1 g/dL. Unchanged from the previous analysis on 5/3/25.    IEPIN No monoclonal protein detected by immunofixation. 05/19/2025    IEPIN  05/03/2025     5/3/25  Monoclonal IgA lambda in the beta region at 0.1 g/dL.    Continued monitoring and correlation with additional testing is recommended to determine the significance of this band.             IgG Lab Results   Component Value Date    IGG 1,110 05/23/2025      IgA Lab Results   Component Value Date     05/23/2025      IgM Lab Results   Component Value Date    IGM 92 05/23/2025        Other  "Labs  Lab Results   Component Value Date    WBC 8.1 06/11/2025    NRBC 0.0 06/11/2025    RBC 4.30 (L) 06/11/2025    HGB 13.4 (L) 06/11/2025    HCT 40.3 (L) 06/11/2025    MCV 94 06/11/2025    MCH 31.2 06/11/2025    MCHC 33.3 06/11/2025    RDW 12.2 06/11/2025     06/11/2025    IGPCT 0.2 06/11/2025    LYMPHOPCT 25.9 06/11/2025    MONOPCT 8.3 06/11/2025    EOSPCT 2.7 06/11/2025    BASOPCT 0.5 06/11/2025    NEUTROABS 5.06 06/11/2025    MONOSABS 0.67 06/11/2025    EOSABS 0.22 06/11/2025       Lab Results   Component Value Date    GLUCOSE 92 05/23/2025     05/23/2025    K 4.1 05/23/2025     05/23/2025    CO2 30 05/23/2025    ANIONGAP 14 05/23/2025    BUN 26 (H) 05/23/2025    CREATININE 0.98 05/23/2025    EGFR 79 05/23/2025    CALCIUM 9.7 05/23/2025    ALBUMIN 4.2 05/23/2025    ALKPHOS 39 05/23/2025    PROT 7.0 05/23/2025    PROT 6.9 05/23/2025    AST 18 05/23/2025    BILITOT 0.5 05/23/2025    ALT 20 05/23/2025       No results found for: \"LDH\"    ASSESSMENT & PLAN   IgA Lambda MGUS  - M spike 0.1g/dl  - B sx: neuropathy  - no SLiM CRAB criteria  - BMBX shows 3% plasma cells  - RTC: 6 mo w/ repeat labs       CHARY Maurice-CNP  "

## 2025-06-23 LAB — CHROM ANALY OVERALL INTERP-IMP: NORMAL

## 2025-06-24 ENCOUNTER — HOSPITAL ENCOUNTER (OUTPATIENT)
Dept: RADIOLOGY | Facility: HOSPITAL | Age: 80
Discharge: HOME | End: 2025-06-24
Payer: COMMERCIAL

## 2025-06-24 DIAGNOSIS — I73.9 CLAUDICATION: ICD-10-CM

## 2025-06-24 DIAGNOSIS — I73.9 PAD (PERIPHERAL ARTERY DISEASE): ICD-10-CM

## 2025-06-24 LAB
COMMENTS - MP RESULT TYPE: NORMAL
SCAN RESULT: NORMAL

## 2025-06-24 PROCEDURE — 75635 CT ANGIO ABDOMINAL ARTERIES: CPT

## 2025-06-24 PROCEDURE — 75635 CT ANGIO ABDOMINAL ARTERIES: CPT | Performed by: STUDENT IN AN ORGANIZED HEALTH CARE EDUCATION/TRAINING PROGRAM

## 2025-06-24 PROCEDURE — 2550000001 HC RX 255 CONTRASTS: Performed by: STUDENT IN AN ORGANIZED HEALTH CARE EDUCATION/TRAINING PROGRAM

## 2025-06-24 RX ADMIN — IOHEXOL 100 ML: 350 INJECTION, SOLUTION INTRAVENOUS at 17:27

## 2025-07-02 ENCOUNTER — OFFICE VISIT (OUTPATIENT)
Dept: CARDIOLOGY | Facility: HOSPITAL | Age: 80
End: 2025-07-02
Payer: COMMERCIAL

## 2025-07-02 VITALS
WEIGHT: 212 LBS | BODY MASS INDEX: 31.4 KG/M2 | DIASTOLIC BLOOD PRESSURE: 58 MMHG | OXYGEN SATURATION: 94 % | SYSTOLIC BLOOD PRESSURE: 106 MMHG | HEART RATE: 84 BPM | HEIGHT: 69 IN

## 2025-07-02 DIAGNOSIS — I10 BENIGN ESSENTIAL HYPERTENSION: ICD-10-CM

## 2025-07-02 DIAGNOSIS — I73.9 CLAUDICATION: ICD-10-CM

## 2025-07-02 DIAGNOSIS — I73.9 PAD (PERIPHERAL ARTERY DISEASE): Primary | ICD-10-CM

## 2025-07-02 PROCEDURE — 3074F SYST BP LT 130 MM HG: CPT | Performed by: STUDENT IN AN ORGANIZED HEALTH CARE EDUCATION/TRAINING PROGRAM

## 2025-07-02 PROCEDURE — 99214 OFFICE O/P EST MOD 30 MIN: CPT | Performed by: STUDENT IN AN ORGANIZED HEALTH CARE EDUCATION/TRAINING PROGRAM

## 2025-07-02 PROCEDURE — 3078F DIAST BP <80 MM HG: CPT | Performed by: STUDENT IN AN ORGANIZED HEALTH CARE EDUCATION/TRAINING PROGRAM

## 2025-07-02 PROCEDURE — 99212 OFFICE O/P EST SF 10 MIN: CPT | Performed by: STUDENT IN AN ORGANIZED HEALTH CARE EDUCATION/TRAINING PROGRAM

## 2025-07-02 PROCEDURE — 1159F MED LIST DOCD IN RCRD: CPT | Performed by: STUDENT IN AN ORGANIZED HEALTH CARE EDUCATION/TRAINING PROGRAM

## 2025-07-02 RX ORDER — METOPROLOL SUCCINATE 25 MG/1
25 TABLET, EXTENDED RELEASE ORAL DAILY
Qty: 30 TABLET | Refills: 11 | Status: SHIPPED | OUTPATIENT
Start: 2025-07-02 | End: 2026-07-02

## 2025-07-02 NOTE — H&P (VIEW-ONLY)
[facility] Note    Date: 07/03/25  Time: 3:10 PM    Subjective   Cristiano Duarte is a 79 y.o. male who is coming to cardiac clinic for follow up care.  Patient has been referred to me by Dr. Tobias Edwards MD. Patient has history of bifascicular block, MGUS, hypertension, hyperlipidemia, TIA.  Patient was referred to me for evaluation of PAD.  Patient has been having claudication symptoms for few months.  He reports leg discomfort/heaviness/numbness with walking.  His activity is limited due to the same.  He can walk 30 steps before starting getting symptoms.  The symptoms are resolved with resting.  His CHI was performed which showed moderate disease bilaterally.     He denies any lower extremity wound/ulcer.     He denies any chest pain or dyspnea.    We performed CTA which showed b/l PAD, see full report below.     Patient was stared on cilostazol. He didn't start rehab yet due to schedule issues.         Smoking- past smoker, quit when he was 71 yrs ago   Alcohol- past   Illicit drug-denies  Family history-noncontributory    Review of Systems:  Otherwise, limited cardiovascular review of systems is negative.        Medical History:   He has a past medical history of Essential (primary) hypertension (12/08/2013).  Surgical History:   Surgical History[1]PSHP@  Social History:   Social Drivers of Health with Concerns     Tobacco Use: Medium Risk (7/2/2025)    Patient History     Smoking Tobacco Use: Former     Smokeless Tobacco Use: Former     Passive Exposure: Not on file   Physical Activity: Not on file   Stress: Not on file   Social Connections: Not on file   Digital Equity: Not on file   Health Literacy: Not on file     Family History:   Family History[2]   Allergies:  Patient has no known allergies.    Outpatient Medications:  Current Outpatient Medications   Medication Instructions    aspirin 81 mg, oral, Daily    atorvastatin (LIPITOR) 20 mg, Nightly    cilostazol  "(PLETAL) 50 mg, oral, 2 times daily    doxazosin (Cardura) 4 mg tablet 1 tablet, Nightly    finasteride (Proscar) 5 mg tablet 1 tablet, Daily (0630)    gabapentin (NEURONTIN) 300 mg, oral, 3 times daily    lisinopril 5 mg, Daily RT    metoprolol succinate XL (TOPROL-XL) 25 mg, oral, Daily    tamsulosin (FLOMAX) 0.4 mg, Daily       Objective     Physical Exam  Vitals:    07/02/25 1522   BP: 106/58   BP Location: Right arm   Patient Position: Sitting   BP Cuff Size: Adult   Pulse: 84   SpO2: 94%   Weight: 96.2 kg (212 lb)   Height: 1.74 m (5' 8.5\")     Wt Readings from Last 3 Encounters:   07/02/25 96.2 kg (212 lb)   06/11/25 96.6 kg (212 lb 15.4 oz)   06/05/25 95.3 kg (210 lb)       General: Alert and Oriented, No distress, cooperative  Head: Normocephalic without obvious abnormality, atraumatic  Eyes: Conjunctiva/corneas clear, EOM's grossly intact  Neck: Supple, trachea midline, No thyroid enlargement/tenderness/nodules; No JVD  Lungs: Clear to auscultation bilaterally, no wheezes, rhonci, or rales. respirations unlabored  Chest Wall: No tenderness or deformity  Heart: Regular rhythm, normal S1/S2, no murmur  Abdomen: Soft, non-tender, Non-distended, bowel sounds active  Extremities: No edema, no cyanosis, no clubbing  Skin: Skin color, texture, turgor normal.  No rashes or lesions noted  Neurologic: Alert and oriented x 3, grossly moving all extremities, speech intact        I have personally reviewed the following images and laboratory findings:  ECG: not done   Echocardiogram:   Vascular Lab Report  VASC US PVR WITHOUT EXERCISE        Patient Name:     HASEEB CHAWLA    Reading           [id number] [address]                                        Physician:        MD  Study Date:       4/23/2025           Ordering          [id number] [address]INTERS                                        Physician:        YONI  MRN/PID:          [id number]            Technologist:     Genesis Crocker T  Accession#:       " [id number]        Technologist 2:  Date of           1945 / 79      Encounter#:       [id number]  Birth/Age:        years  Gender:           M  Admission Status: Outpatient          Location          [facility]                                        Performed:        Diagnosis/ICD: Peripheral vascular disease, unspecified-I73.9  CPT Codes:     66143 Peripheral artery PVR (multi segmental pressure        CONCLUSIONS:  Right Lower PVR: Evidence of moderate arterial occlusive disease in the right lower extremity at rest. Decreased digital perfusion noted. Monophasic flow is noted in the right posterior tibial artery and right dorsalis pedis artery. Multiphasic flow is noted in the right common femoral artery and right popliteal artery.  Left Lower PVR: Evidence of moderate arterial occlusive disease in the left lower extremity at rest. Decreased digital perfusion noted. Monophasic flow is noted in the left posterior tibial artery and left dorsalis pedis artery. Multiphasic flow is noted in the left common femoral artery and left popliteal artery.     Imaging & Doppler Findings:     RIGHT Lower PVR                Pressures Ratios  Right High Thigh               255 mmHg  1.63  Right Low Thigh                128 mmHg  0.82  Right Calf                     98 mmHg   0.63  Right Posterior Tibial (Ankle) 76 mmHg   0.49  Right Dorsalis Pedis (Ankle)   105 mmHg  0.67  Right Digit (Great Toe)        35 mmHg   0.22           LEFT Lower PVR                Pressures Ratios  Left High Thigh               134 mmHg  0.86  Left Low Thigh                105 mmHg  0.67  Left Calf                     104 mmHg  0.67  Left Posterior Tibial (Ankle) 90 mmHg   0.58  Left Dorsalis Pedis (Ankle)   82 mmHg   0.53  Left Digit (Great Toe)        30 mmHg   0.19                           Right     Left  Brachial Pressure 155 mmHg 156 mmHg       CTA Runoff  IMPRESSION:  1. Overall severe atherosclerotic disease throughout the  visualized  arteries, worst in the bilateral common femoral and bilateral  superficial femoral arteries as described above. There is arterial  flow to at least the level of the right ankle and left distal foot.  2. Nonobstructive right renal calculus.  3. Other chronic findings as above.        Laboratory values:   Lab on 06/11/2025   Component Date Value    Scan Result 06/11/2025 See Scanned Result     Comments 06/11/2025                      Value:Report has been scanned to the patient's chart.   Office Visit on 06/05/2025   Component Date Value    Ventricular Rate 06/05/2025 65     Atrial Rate 06/05/2025 65     GA Interval 06/05/2025 158     QRS Duration 06/05/2025 164     QT Interval 06/05/2025 446     QTC Calculation(Bazett) 06/05/2025 463     P Axis 06/05/2025 6     R Axis 06/05/2025 -56     T [place] 06/05/2025 4     QRS Count 06/05/2025 10     Q Onset 06/05/2025 201     P Onset 06/05/2025 122     P Offset 06/05/2025 189     T Offset 06/05/2025 424     QTC [place] 06/05/2025 457     Cholesterol 06/11/2025 151     HDL-Cholesterol 06/11/2025 39.6     Cholesterol/HDL Ratio 06/11/2025 3.8     LDL Calculated 06/11/2025 67     VLDL 06/11/2025 44 (H)     Triglycerides 06/11/2025 221 (H)     Non HDL Cholesterol 06/11/2025 111     Hemoglobin A1C 06/11/2025 6.1 (H)     Estimated Average Glucose 06/11/2025 128    Lab on 05/23/2025   Component Date Value    Beta-2 Microglobulin 05/23/2025 3.0 (H)     WBC 05/23/2025 8.0     nRBC 05/23/2025 0.0     RBC 05/23/2025 4.54     Hemoglobin 05/23/2025 14.2     Hematocrit 05/23/2025 43.4     MCV 05/23/2025 96     MCH 05/23/2025 31.3     MCHC 05/23/2025 32.7     RDW 05/23/2025 13.0     Platelets 05/23/2025 206     Neutrophils % 05/23/2025 62.7     Immature Granulocytes %,* 05/23/2025 0.3     Lymphocytes % 05/23/2025 24.2     Monocytes % 05/23/2025 9.3     Eosinophils % 05/23/2025 3.0     Basophils % 05/23/2025 0.5     Neutrophils Absolute 05/23/2025 5.02     Immature Granulocytes  Ab* 05/23/2025 0.02     Lymphocytes Absolute 05/23/2025 1.93     Monocytes Absolute 05/23/2025 0.74     Eosinophils Absolute 05/23/2025 0.24     Basophils Absolute 05/23/2025 0.04     Glucose 05/23/2025 92     Sodium 05/23/2025 144     Potassium 05/23/2025 4.1     Chloride 05/23/2025 104     Bicarbonate 05/23/2025 30     Anion Gap 05/23/2025 14     Urea Nitrogen 05/23/2025 26 (H)     Creatinine 05/23/2025 0.98     eGFR 05/23/2025 79     Calcium 05/23/2025 9.7     Albumin 05/23/2025 4.2     Alkaline Phosphatase 05/23/2025 39     Total Protein 05/23/2025 7.0     AST 05/23/2025 18     Bilirubin, Total 05/23/2025 0.5     ALT 05/23/2025 20     Ig [place] Free Light Chain 05/23/2025 3.79 (H)     Ig Lambda Free Light Delaney* 05/23/2025 2.45     Kappa/Lambda Ratio 05/23/2025 1.55     IgG 05/23/2025 1,110     IgA 05/23/2025 299     IgM 05/23/2025 92     Total Protein 05/23/2025 6.9     Albumin 05/23/2025 4.0     Alpha 1 Globulin 05/23/2025 0.2     Alpha 2 Globulin 05/23/2025 0.7     Beta Globulin 05/23/2025 0.9     Gamma 05/23/2025 1.0     M-PROTEIN 1 05/23/2025 0.1 (H)     Protein Electrophoresis * 05/23/2025 Aberrant band detected. See immunofixation.     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 Unchanged from the previous analysis on 5/3/25.     Path Review - Serum Prot* 05/23/2025 Reviewed and approved by JEFFREY CALDERON on 6/1/25 at 10:08 AM.         Path Review - Serum Immu* 05/23/2025 Reviewed and approved by JEFFREY CALDERON on 6/1/25 at 10:08 AM.        Office Visit on 05/23/2025   Component Date Value    Case Report 06/11/2025                      Value:Surgical Pathology                                Case: [id number]                                  Authorizing Provider:  NEISHA Maurice Collected:           06/11/2025 1100              Ordering Location:     [facility]   Received:            06/11/2025 1127              Pathologist:            Lubna Burns MD                                                             Specimens:   A) - BONE MARROW CLOT, RIGHT ILIAC CREST                                                            B) - BONE MARROW CORE BIOPSY, RIGHT ILIAC CREST                                                     C) - BONE MARROW ASPIRATE, RIGHT ILIAC CREST                                               FINAL DIAGNOSIS 06/11/2025                      Value:A, B & C. 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 Concurrent flow cytometry shows a lambda restricted plasma cells in a polyclonal background. The bone marrow biopsy, kappa / lambda IHC shows lambda restricted plasma cells in a polyclonal background. It is difficult to enumerate the number of clonal plasma cells since it is in a polyclonal background.  highlights 5-7% plasma cells in the marrow elements.   Flow cytometry also shows 0.3% of cells being B-cells that are kappa restricted and CD5 positive. 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 By IHC the lymphoid aggregates present show CD20 positive aggregates however co-expression of CD5 is difficult to interpret due to many T-cells being present. The CD20 positive aggregates represent, 2-3% of marrow elements. Overall, findings support the above diagnosis. Clinical and molecular results correlation is suggested.        Bone Marrow Differential 06/11/2025                      Value:Cell Type Value Reference Range   Promyelocytes 3.5 1-5  %   Myelocytes 14.5 5-10 %   Metamyelocytes 8.0 10-25 %   Bands 23.0 10-20 %   Segmented Neutrophils 14.0 5-30 %   Eosinophils 6.0 2-4 %   Basophils 0.0 0-1 %        Lymphocytes 3.0 5-25 %   Monocytes 0.0 0-2 %   Plasma Cells 3.0 0-2 %        Blasts 0.0 0-1 %        Total Erythroid 25.0 17-35 %        Total Cells Counted 200    Myeloid/Erythroid Ratio 2.8 1.5-4.1         Microscopic Description 06/11/2025                      Value:PERIPHERAL SMEAR: Submitted              Red cells: Normocytic anemia.        White cells: Normal, adequate.        Platelets: Normal, adequate.       Comments:     ASPIRATE SMEAR: Submitted                     Specimen: Spicular.       Erythropoiesis: Normal.       Granulopoiesis: Normal.       Megakaryocytes: Present. Morphology: Normal.       Comments: Plasma cells 3%    TOUCH PREP: Submitted       Specimen: Cellular.        Comments: Similar to aspirate smear.    ASPIRATE CLOT: Submitted                                    Specimen: Spicular.       Cellularity: 50%               Estimated M:E ratio: Consistent with aspirate smear.       Megakaryocytes: Adequate. Morphology: Normal.       Granulomas: Absent.       Lymphoid aggregates: Absent.        Comments: Similar to core biopsy.    CORE BIOPSY: Submitted                            Specimen: Adequate.       Cellularity: <10-50% Variable       Estimated M:E ratio: Consistent with aspirate smear.       Bony trabeculae: Normal.                                 Megakaryocytes: Adequate. Morphology: Normal.         Granulomas: Absent.       Lymphoid aggregates: Present. Small lymphocytes.        Comments:     SPECIAL STAINS:          Iron (performed on clot, part A): Storage iron adequate; no ring sideroblasts identified.    IMMUNOHISTOCHEMISTRY: Performed on the core, block B:   CD20 Highlights scattered B cells and in aggregate               CD3 Highlights more scattered T cells and in aggregate  PAX5 Similar to CD20  CD5 Similar to  "CD3   CD23 Rare scattered cells present   Cyclin D1   Negative    Scattered plasma cells, 5-7% of marrow elements   Kappa/Lambda IHC    plasma cells are lambda restricted, in a background of polyclonal plasma cells   Kappa/Lambda CHARLENE    negative (non specific staining)    FLOW CYTOMETRY: Performed, see separate report, [id number].    Immunostains were performed in addition to flow cytometry to fully characterize the phenotype, architecture, and extent of the marrow population(s).  This was medically                           necessary for the best possible diagnosis.      The gross and/or microscopic findings were reviewed in conjunction with pathology resident, Jessy Boogie MD.          Gross Description 06/11/2025                      Value:A:  Received in formalin, labeled with the patient's name and hospital number and \"C, clot, right\", is an irregular fragment of blood clot measuring 0.7 x 0.4 x 0.2 cm. The specimen is submitted in toto in one cassette.  BMG      B:  Received in B-plus fixative, labeled with the patient's name and hospital number and \"B, bone marrow core\", are two cylindrical segments of bone aggregating to 2.4 x 0.5 x 0.4 cm. The specimen is submitted in toto in one cassette following decalcification in Rapid Sai Immuno.  BMG       Case Report 06/11/2025                      Value:Flow Cytometry                                    Case: [id number]                                   Authorizing Provider:  NEISHA Maurice Collected:           06/11/2025 1100              [facility]   Received:            06/11/2025 1127              Pathologist:           Lubna Burns MD                                                             Specimen:    Bone Marrow Aspirate                                                                       Diagnosis 06/11/2025                      Value:  --Monoclonal plasma cells, lambda restricted, (<0.1% of " cells) detected  --CD5+ Monoclonal B-cells (0.3% of cells) detected  --No clonal T-cell population     Note:  The CD5+ monoclonal B-cells are kappa restricted. 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 Clinical and correlation with the bone marrow biopsy is suggested.           06/11/2025                      Value:By the signature on this report, the individual or group listed as making the Final Interpretation/Diagnosis certifies that they have reviewed this case and the staining reactivity of the antibodies and reagents in the analysis were determined to be acceptable. Diagnostic interpretation performed at [facility]      Cell Populations 06/11/2025                      Value:Abnormal Cell Population: B-Lymphocytes    Percentage:  0.3 % of cells       Phenotype   Marker Interpretation      CD1c Positive dim   CD5 Positive moderate   CD11c Negative   CD19 Positive dim-moderate   CD20 Positive moderate   CD23 Partial   CD25 Negative   CD43 Negative   CD45 Positive moderate   CD79b Positive dim-moderate   Cytoplasmic Kappa Positive moderate   Cytoplasmic Lambda Negative      IgD Positive heterogeneous              Second Abnormal Cell Population: Plasma Cells    Percentage: <0.1 %       Phenotype   Marker Interpretation      CD19 Negative   CD27 Positive dim   CD38 Positive moderate-bright   CD45 Dim-negative   CD56 Positive dim    Positive moderate   Cytoplasmic Kappa Negative   Cytoplasmic Lambda Positive moderate                  Flow Differential 06/11/2025                      Value:Lymphocyte: 11 %       CD3+CD4+: 56 % ; polyclonal TCRB1          CD3+CD8+: 27 % ; polyclonal TCRB1          Natural Killer Cells: 10 %         CD19+: 17 %         B Cell Light Chain Expression: Polyclonal, subset monoclonal (0.3% of cells)          Surface Kappa/Surface Lambda: 93:6        Monocyte: 4 %       Granulocyte: 66 %       Plasma Cell: 0.20 %, subset monoclonal  (<0.1 of  cells)            Flow Test Ordered 06/11/2025 Low Grade Panel     Cell Count 06/11/2025 11.35     Number of Cells Collected 06/11/2025 100,000.00     Methodology 06/11/2025                      Value:Reference ranges not established.    This test is a multicolor, whole blood lysis assay. It was developed and its performance characteristics determined by the Department of Pathology, [facility], and has not been cleared or approved by the U.S. Food and Drug Administration. The laboratory is regulated under CLIA as qualified to perform high complexity testing. This test is used for clinical purposes. It should not be regarded as investigational or for research.    Immunophenotypic analysis was performed using the following antibodies: 1A: CD45. 1B: CD71, CD13, , CD14, HLA-DR, CD45. 1C: CD2, CD7, CD4, CD5, CD3, CD45. 1D: CD16, CD56, CD4, CD8, CD3, CD45. 1E: CD56, CD10, CD20, CD38, CD19, CD45. 1F: CD43, CD23, CD1c, CD5, CD19, CD45. 1G: CD25, , CD11c, CD79b, CD19, CD45. 1H: Kappa Surface, Lambda Surface, IgD, CD5, CD19, CD45. 1I: TRBC1, TCR Gamma/Delta, CD4, CD8, CD3, CD45. 1K: Cytoplasmic Kappa, Cytoplasmic Lambda, , CD38, CD19, CD45. 1L: ,                           CD56, CD27, CD38, CD19, CD45.          WBC 06/11/2025 8.1     nRBC 06/11/2025 0.0     RBC 06/11/2025 4.30 (L)     Hemoglobin 06/11/2025 13.4 (L)     Hematocrit 06/11/2025 40.3 (L)     MCV 06/11/2025 94     MCH 06/11/2025 31.2     MCHC 06/11/2025 33.3     RDW 06/11/2025 12.2     Platelets 06/11/2025 187     Neutrophils % 06/11/2025 62.4     Immature Granulocytes %,* 06/11/2025 0.2     Lymphocytes % 06/11/2025 25.9     Monocytes % 06/11/2025 8.3     Eosinophils % 06/11/2025 2.7     Basophils % 06/11/2025 0.5     Neutrophils Absolute 06/11/2025 5.06     Immature Granulocytes Ab* 06/11/2025 0.02     Lymphocytes Absolute 06/11/2025 2.10     Monocytes Absolute 06/11/2025 0.67     Eosinophils Absolute 06/11/2025 0.22      Basophils Absolute 06/11/2025 0.04     Cytogenetics Interpretat* 06/11/2025                      Value:Per Hematopathology Protocol, this 6/11/2025 Bone Marrow specimen has been placed on culture and hold status.      Cultured cells will be kept until 12/20/2025.  Chromosome and FISH testing can be added to the specimen up to 180 days after receipt.  Please contact the Genetics lab at [phone] for details.     Orders Only on 05/13/2025   Component Date Value    Collection period 05/19/2025 24     Urine Volume 05/19/2025 1,850     Total Protein, Urine 05/19/2025 6     Total Protein,  24 Hour * 05/19/2025 111     Albumin % 05/19/2025 33.2     Alpha 1 Globulin % 05/19/2025 7.7     Alpha 2 Globulin % 05/19/2025 15.8     Beta Globulin % 05/19/2025 21.3     Gamma Globulin % 05/19/2025 22.0     Urine Electrophoresis Co* 05/19/2025 Normal.         Path Review-Urine Protei* 05/19/2025 Reviewed and approved by JEFFREY CALDERON on 5/22/25 at 4:12 PM.         Path Review - Urine Immu* 05/19/2025 Reviewed and approved by JEFFREY CALDERON on 5/22/25 at 4:12 PM.         Immunofixation Comment 05/19/2025 No monoclonal protein detected by immunofixation.      CBC -  Lab Results   Component Value Date    WBC 8.1 06/11/2025    HGB 13.4 (L) 06/11/2025    HCT 40.3 (L) 06/11/2025    MCV 94 06/11/2025     06/11/2025       CMP -  Lab Results   Component Value Date    CALCIUM 9.7 05/23/2025    PHOS 4.5 03/29/2024    PROT 7.0 05/23/2025    PROT 6.9 05/23/2025    ALBUMIN 4.2 05/23/2025    AST 18 05/23/2025    ALT 20 05/23/2025    ALKPHOS 39 05/23/2025    BILITOT 0.5 05/23/2025       LIPID PANEL -   Lab Results   Component Value Date    CHOL 151 06/11/2025    HDL 39.6 06/11/2025    CHHDL 3.8 06/11/2025    VLDL 44 (H) 06/11/2025    TRIG 221 (H) 06/11/2025    NHDL 111 06/11/2025       RENAL FUNCTION PANEL -   Lab Results   Component Value Date    K 4.1 05/23/2025    PHOS 4.5 03/29/2024       Lab Results   Component Value Date    BNP 20  12/12/2024    HGBA1C 6.1 (H) 06/11/2025        Assessment/Plan   PAD/claudication  Abnormal EKG/bifascicular block  Essential hypertension  Hyperlipidemia     Plan:  -I discussed results of iliofemoral CTA the patient.  Based on results proceed with angiography/intervention.    -I will continue on cilostazol 50 mg twice daily.  -I will refer to vascular rehab for supervised exercise program.  -I will continue on aspirin 81 mg daily.  -Patient already on statin therapy I will continue. .  - Patient on metoprolol and lisinopril.  Blood pressure on lower side.  I will cut down on metoprolol.  Continue lisinopril as taking.       Follow-up after testing.  In addition, the following orders were placed today:  No orders of the defined types were placed in this encounter.                SIGNATURE: Rolo Ewing MD PATIENT NAME: Cristiano Duarte   DATE/TIME: July 3, 2025 3:10 PM MRN: [id number]                                  [1]   Past Surgical History:  Procedure Laterality Date    HERNIA REPAIR  01/14/2016    Hernia Repair   [2] No family history on file.

## 2025-07-02 NOTE — PROGRESS NOTES
[facility] Note    Date: 07/03/25  Time: 3:10 PM    Subjective   Cristiano Duarte is a 79 y.o. male who is coming to cardiac clinic for follow up care.  Patient has been referred to me by Dr. Tobias Edwards MD. Patient has history of bifascicular block, MGUS, hypertension, hyperlipidemia, TIA.  Patient was referred to me for evaluation of PAD.  Patient has been having claudication symptoms for few months.  He reports leg discomfort/heaviness/numbness with walking.  His activity is limited due to the same.  He can walk 30 steps before starting getting symptoms.  The symptoms are resolved with resting.  His CHI was performed which showed moderate disease bilaterally.     He denies any lower extremity wound/ulcer.     He denies any chest pain or dyspnea.    We performed CTA which showed b/l PAD, see full report below.     Patient was stared on cilostazol. He didn't start rehab yet due to schedule issues.         Smoking- past smoker, quit when he was 71 yrs ago   Alcohol- past   Illicit drug-denies  Family history-noncontributory    Review of Systems:  Otherwise, limited cardiovascular review of systems is negative.        Medical History:   He has a past medical history of Essential (primary) hypertension (12/08/2013).  Surgical History:   Surgical History[1]PSHP@  Social History:   Social Drivers of Health with Concerns     Tobacco Use: Medium Risk (7/2/2025)    Patient History     Smoking Tobacco Use: Former     Smokeless Tobacco Use: Former     Passive Exposure: Not on file   Physical Activity: Not on file   Stress: Not on file   Social Connections: Not on file   Digital Equity: Not on file   Health Literacy: Not on file     Family History:   Family History[2]   Allergies:  Patient has no known allergies.    Outpatient Medications:  Current Outpatient Medications   Medication Instructions    aspirin 81 mg, oral, Daily    atorvastatin (LIPITOR) 20 mg, Nightly    cilostazol  "(PLETAL) 50 mg, oral, 2 times daily    doxazosin (Cardura) 4 mg tablet 1 tablet, Nightly    finasteride (Proscar) 5 mg tablet 1 tablet, Daily (0630)    gabapentin (NEURONTIN) 300 mg, oral, 3 times daily    lisinopril 5 mg, Daily RT    metoprolol succinate XL (TOPROL-XL) 25 mg, oral, Daily    tamsulosin (FLOMAX) 0.4 mg, Daily       Objective     Physical Exam  Vitals:    07/02/25 1522   BP: 106/58   BP Location: Right arm   Patient Position: Sitting   BP Cuff Size: Adult   Pulse: 84   SpO2: 94%   Weight: 96.2 kg (212 lb)   Height: 1.74 m (5' 8.5\")     Wt Readings from Last 3 Encounters:   07/02/25 96.2 kg (212 lb)   06/11/25 96.6 kg (212 lb 15.4 oz)   06/05/25 95.3 kg (210 lb)       General: Alert and Oriented, No distress, cooperative  Head: Normocephalic without obvious abnormality, atraumatic  Eyes: Conjunctiva/corneas clear, EOM's grossly intact  Neck: Supple, trachea midline, No thyroid enlargement/tenderness/nodules; No JVD  Lungs: Clear to auscultation bilaterally, no wheezes, rhonci, or rales. respirations unlabored  Chest Wall: No tenderness or deformity  Heart: Regular rhythm, normal S1/S2, no murmur  Abdomen: Soft, non-tender, Non-distended, bowel sounds active  Extremities: No edema, no cyanosis, no clubbing  Skin: Skin color, texture, turgor normal.  No rashes or lesions noted  Neurologic: Alert and oriented x 3, grossly moving all extremities, speech intact        I have personally reviewed the following images and laboratory findings:  ECG: not done   Echocardiogram:   Vascular Lab Report  VASC US PVR WITHOUT EXERCISE        Patient Name:     HASEEB CHAWLA    Reading           [id number] [address]                                        Physician:        MD  Study Date:       4/23/2025           Ordering          [id number] [address]                                        Physician:        YONI  MRN/PID:          [id number]            Technologist:     Genesis Crocker T  Accession#:       " [id number]        Technologist 2:  Date of           1945 / 79      Encounter#:       [id number]  Birth/Age:        years  Gender:           M  Admission Status: Outpatient          Location          [facility]                                        Performed:        Diagnosis/ICD: Peripheral vascular disease, unspecified-I73.9  CPT Codes:     05724 Peripheral artery PVR (multi segmental pressure        CONCLUSIONS:  Right Lower PVR: Evidence of moderate arterial occlusive disease in the right lower extremity at rest. Decreased digital perfusion noted. Monophasic flow is noted in the right posterior tibial artery and right dorsalis pedis artery. Multiphasic flow is noted in the right common femoral artery and right popliteal artery.  Left Lower PVR: Evidence of moderate arterial occlusive disease in the left lower extremity at rest. Decreased digital perfusion noted. Monophasic flow is noted in the left posterior tibial artery and left dorsalis pedis artery. Multiphasic flow is noted in the left common femoral artery and left popliteal artery.     Imaging & Doppler Findings:     RIGHT Lower PVR                Pressures Ratios  Right High Thigh               255 mmHg  1.63  Right Low Thigh                128 mmHg  0.82  Right Calf                     98 mmHg   0.63  Right Posterior Tibial (Ankle) 76 mmHg   0.49  Right Dorsalis Pedis (Ankle)   105 mmHg  0.67  Right Digit (Great Toe)        35 mmHg   0.22           LEFT Lower PVR                Pressures Ratios  Left High Thigh               134 mmHg  0.86  Left Low Thigh                105 mmHg  0.67  Left Calf                     104 mmHg  0.67  Left Posterior Tibial (Ankle) 90 mmHg   0.58  Left Dorsalis Pedis (Ankle)   82 mmHg   0.53  Left Digit (Great Toe)        30 mmHg   0.19                           Right     Left  Brachial Pressure 155 mmHg 156 mmHg       CTA Runoff  IMPRESSION:  1. Overall severe atherosclerotic disease throughout the  visualized  arteries, worst in the bilateral common femoral and bilateral  superficial femoral arteries as described above. There is arterial  flow to at least the level of the right ankle and left distal foot.  2. Nonobstructive right renal calculus.  3. Other chronic findings as above.        Laboratory values:   Lab on 06/11/2025   Component Date Value    Scan Result 06/11/2025 See Scanned Result     Comments 06/11/2025                      Value:Report has been scanned to the patient's chart.   Office Visit on 06/05/2025   Component Date Value    Ventricular Rate 06/05/2025 65     Atrial Rate 06/05/2025 65     LA Interval 06/05/2025 158     QRS Duration 06/05/2025 164     QT Interval 06/05/2025 446     QTC Calculation(Bazett) 06/05/2025 463     P Axis 06/05/2025 6     R Axis 06/05/2025 -56     T Dunbar 06/05/2025 4     QRS Count 06/05/2025 10     Q Onset 06/05/2025 201     P Onset 06/05/2025 122     P Offset 06/05/2025 189     T Offset 06/05/2025 424     QTC Fredericia 06/05/2025 457     Cholesterol 06/11/2025 151     HDL-Cholesterol 06/11/2025 39.6     Cholesterol/HDL Ratio 06/11/2025 3.8     LDL Calculated 06/11/2025 67     VLDL 06/11/2025 44 (H)     Triglycerides 06/11/2025 221 (H)     Non HDL Cholesterol 06/11/2025 111     Hemoglobin A1C 06/11/2025 6.1 (H)     Estimated Average Glucose 06/11/2025 128    Lab on 05/23/2025   Component Date Value    Beta-2 Microglobulin 05/23/2025 3.0 (H)     WBC 05/23/2025 8.0     nRBC 05/23/2025 0.0     RBC 05/23/2025 4.54     Hemoglobin 05/23/2025 14.2     Hematocrit 05/23/2025 43.4     MCV 05/23/2025 96     MCH 05/23/2025 31.3     MCHC 05/23/2025 32.7     RDW 05/23/2025 13.0     Platelets 05/23/2025 206     Neutrophils % 05/23/2025 62.7     Immature Granulocytes %,* 05/23/2025 0.3     Lymphocytes % 05/23/2025 24.2     Monocytes % 05/23/2025 9.3     Eosinophils % 05/23/2025 3.0     Basophils % 05/23/2025 0.5     Neutrophils Absolute 05/23/2025 5.02     Immature Granulocytes  Ab* 05/23/2025 0.02     Lymphocytes Absolute 05/23/2025 1.93     Monocytes Absolute 05/23/2025 0.74     Eosinophils Absolute 05/23/2025 0.24     Basophils Absolute 05/23/2025 0.04     Glucose 05/23/2025 92     Sodium 05/23/2025 144     Potassium 05/23/2025 4.1     Chloride 05/23/2025 104     Bicarbonate 05/23/2025 30     Anion Gap 05/23/2025 14     Urea Nitrogen 05/23/2025 26 (H)     Creatinine 05/23/2025 0.98     eGFR 05/23/2025 79     Calcium 05/23/2025 9.7     Albumin 05/23/2025 4.2     Alkaline Phosphatase 05/23/2025 39     Total Protein 05/23/2025 7.0     AST 05/23/2025 18     Bilirubin, Total 05/23/2025 0.5     ALT 05/23/2025 20     Ig [place] Free Light Chain 05/23/2025 3.79 (H)     Ig Lambda Free Light Delaney* 05/23/2025 2.45     Kappa/Lambda Ratio 05/23/2025 1.55     IgG 05/23/2025 1,110     IgA 05/23/2025 299     IgM 05/23/2025 92     Total Protein 05/23/2025 6.9     Albumin 05/23/2025 4.0     Alpha 1 Globulin 05/23/2025 0.2     Alpha 2 Globulin 05/23/2025 0.7     Beta Globulin 05/23/2025 0.9     Gamma 05/23/2025 1.0     M-PROTEIN 1 05/23/2025 0.1 (H)     Protein Electrophoresis * 05/23/2025 Aberrant band detected. See immunofixation.     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 Unchanged from the previous analysis on 5/3/25.     Path Review - Serum Prot* 05/23/2025 Reviewed and approved by JEFFREY CALDERON on 6/1/25 at 10:08 AM.         Path Review - Serum Immu* 05/23/2025 Reviewed and approved by JEFFREY CALDERON on 6/1/25 at 10:08 AM.        Office Visit on 05/23/2025   Component Date Value    Case Report 06/11/2025                      Value:Surgical Pathology                                Case: [id number]                                  Authorizing Provider:  NEISHA Maurice Collected:           06/11/2025 1100              Ordering Location:     [facility]   Received:            06/11/2025 1127              Pathologist:            Lubna Burns MD                                                             Specimens:   A) - BONE MARROW CLOT, RIGHT ILIAC CREST                                                            B) - BONE MARROW CORE BIOPSY, RIGHT ILIAC CREST                                                     C) - BONE MARROW ASPIRATE, RIGHT ILIAC CREST                                               FINAL DIAGNOSIS 06/11/2025                      Value:A, B & C. 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 Concurrent flow cytometry shows a lambda restricted plasma cells in a polyclonal background. The bone marrow biopsy, kappa / lambda IHC shows lambda restricted plasma cells in a polyclonal background. It is difficult to enumerate the number of clonal plasma cells since it is in a polyclonal background.  highlights 5-7% plasma cells in the marrow elements.   Flow cytometry also shows 0.3% of cells being B-cells that are kappa restricted and CD5 positive. 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 By IHC the lymphoid aggregates present show CD20 positive aggregates however co-expression of CD5 is difficult to interpret due to many T-cells being present. The CD20 positive aggregates represent, 2-3% of marrow elements. Overall, findings support the above diagnosis. Clinical and molecular results correlation is suggested.        Bone Marrow Differential 06/11/2025                      Value:Cell Type Value Reference Range   Promyelocytes 3.5 1-5  %   Myelocytes 14.5 5-10 %   Metamyelocytes 8.0 10-25 %   Bands 23.0 10-20 %   Segmented Neutrophils 14.0 5-30 %   Eosinophils 6.0 2-4 %   Basophils 0.0 0-1 %        Lymphocytes 3.0 5-25 %   Monocytes 0.0 0-2 %   Plasma Cells 3.0 0-2 %        Blasts 0.0 0-1 %        Total Erythroid 25.0 17-35 %        Total Cells Counted 200    Myeloid/Erythroid Ratio 2.8 1.5-4.1         Microscopic Description 06/11/2025                      Value:PERIPHERAL SMEAR: Submitted              Red cells: Normocytic anemia.        White cells: Normal, adequate.        Platelets: Normal, adequate.       Comments:     ASPIRATE SMEAR: Submitted                     Specimen: Spicular.       Erythropoiesis: Normal.       Granulopoiesis: Normal.       Megakaryocytes: Present. Morphology: Normal.       Comments: Plasma cells 3%    TOUCH PREP: Submitted       Specimen: Cellular.        Comments: Similar to aspirate smear.    ASPIRATE CLOT: Submitted                                    Specimen: Spicular.       Cellularity: 50%               Estimated M:E ratio: Consistent with aspirate smear.       Megakaryocytes: Adequate. Morphology: Normal.       Granulomas: Absent.       Lymphoid aggregates: Absent.        Comments: Similar to core biopsy.    CORE BIOPSY: Submitted                            Specimen: Adequate.       Cellularity: <10-50% Variable       Estimated M:E ratio: Consistent with aspirate smear.       Bony trabeculae: Normal.                                 Megakaryocytes: Adequate. Morphology: Normal.         Granulomas: Absent.       Lymphoid aggregates: Present. Small lymphocytes.        Comments:     SPECIAL STAINS:          Iron (performed on clot, part A): Storage iron adequate; no ring sideroblasts identified.    IMMUNOHISTOCHEMISTRY: Performed on the core, block B:   CD20 Highlights scattered B cells and in aggregate               CD3 Highlights more scattered T cells and in aggregate  PAX5 Similar to CD20  CD5 Similar to  "CD3   CD23 Rare scattered cells present   Cyclin D1   Negative    Scattered plasma cells, 5-7% of marrow elements   Kappa/Lambda IHC    plasma cells are lambda restricted, in a background of polyclonal plasma cells   Kappa/Lambda CHARLENE    negative (non specific staining)    FLOW CYTOMETRY: Performed, see separate report, [id number].    Immunostains were performed in addition to flow cytometry to fully characterize the phenotype, architecture, and extent of the marrow population(s).  This was medically                           necessary for the best possible diagnosis.      The gross and/or microscopic findings were reviewed in conjunction with pathology resident, Jessy Boogie MD.          Gross Description 06/11/2025                      Value:A:  Received in formalin, labeled with the patient's name and hospital number and \"C, clot, right\", is an irregular fragment of blood clot measuring 0.7 x 0.4 x 0.2 cm. The specimen is submitted in toto in one cassette.  BMG      B:  Received in B-plus fixative, labeled with the patient's name and hospital number and \"B, bone marrow core\", are two cylindrical segments of bone aggregating to 2.4 x 0.5 x 0.4 cm. The specimen is submitted in toto in one cassette following decalcification in Rapid Sai Immuno.  BMG       Case Report 06/11/2025                      Value:Flow Cytometry                                    Case: [id number]                                   Authorizing Provider:  NEISHA Maurice Collected:           06/11/2025 1100              Ordering Location:     [facility]   Received:            06/11/2025 1127              Pathologist:           Lubna Burns MD                                                             Specimen:    Bone Marrow Aspirate                                                                       Diagnosis 06/11/2025                      Value:  --Monoclonal plasma cells, lambda restricted, (<0.1% of " cells) detected  --CD5+ Monoclonal B-cells (0.3% of cells) detected  --No clonal T-cell population     Note:  The CD5+ monoclonal B-cells are kappa restricted. 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 Clinical and correlation with the bone marrow biopsy is suggested.           06/11/2025                      Value:By the signature on this report, the individual or group listed as making the Final Interpretation/Diagnosis certifies that they have reviewed this case and the staining reactivity of the antibodies and reagents in the analysis were determined to be acceptable. Diagnostic interpretation performed at [facility]      Cell Populations 06/11/2025                      Value:Abnormal Cell Population: B-Lymphocytes    Percentage:  0.3 % of cells       Phenotype   Marker Interpretation      CD1c Positive dim   CD5 Positive moderate   CD11c Negative   CD19 Positive dim-moderate   CD20 Positive moderate   CD23 Partial   CD25 Negative   CD43 Negative   CD45 Positive moderate   CD79b Positive dim-moderate   Cytoplasmic Kappa Positive moderate   Cytoplasmic Lambda Negative      IgD Positive heterogeneous              Second Abnormal Cell Population: Plasma Cells    Percentage: <0.1 %       Phenotype   Marker Interpretation      CD19 Negative   CD27 Positive dim   CD38 Positive moderate-bright   CD45 Dim-negative   CD56 Positive dim    Positive moderate   Cytoplasmic Kappa Negative   Cytoplasmic Lambda Positive moderate                  Flow Differential 06/11/2025                      Value:Lymphocyte: 11 %       CD3+CD4+: 56 % ; polyclonal TCRB1          CD3+CD8+: 27 % ; polyclonal TCRB1          Natural Killer Cells: 10 %         CD19+: 17 %         B Cell Light Chain Expression: Polyclonal, subset monoclonal (0.3% of cells)          Surface Kappa/Surface Lambda: 93:6        Monocyte: 4 %       Granulocyte: 66 %       Plasma Cell: 0.20 %, subset monoclonal  (<0.1 of  cells)            Flow Test Ordered 06/11/2025 Low Grade Panel     Cell Count 06/11/2025 11.35     Number of Cells Collected 06/11/2025 100,000.00     Methodology 06/11/2025                      Value:Reference ranges not established.    This test is a multicolor, whole blood lysis assay. It was developed and its performance characteristics determined by the Department of Pathology, [facility], and has not been cleared or approved by the U.S. Food and Drug Administration. The laboratory is regulated under CLIA as qualified to perform high complexity testing. This test is used for clinical purposes. It should not be regarded as investigational or for research.    Immunophenotypic analysis was performed using the following antibodies: 1A: CD45. 1B: CD71, CD13, , CD14, HLA-DR, CD45. 1C: CD2, CD7, CD4, CD5, CD3, CD45. 1D: CD16, CD56, CD4, CD8, CD3, CD45. 1E: CD56, CD10, CD20, CD38, CD19, CD45. 1F: CD43, CD23, CD1c, CD5, CD19, CD45. 1G: CD25, , CD11c, CD79b, CD19, CD45. 1H: Kappa Surface, Lambda Surface, IgD, CD5, CD19, CD45. 1I: TRBC1, TCR Gamma/Delta, CD4, CD8, CD3, CD45. 1K: Cytoplasmic Kappa, Cytoplasmic Lambda, , CD38, CD19, CD45. 1L: ,                           CD56, CD27, CD38, CD19, CD45.          WBC 06/11/2025 8.1     nRBC 06/11/2025 0.0     RBC 06/11/2025 4.30 (L)     Hemoglobin 06/11/2025 13.4 (L)     Hematocrit 06/11/2025 40.3 (L)     MCV 06/11/2025 94     MCH 06/11/2025 31.2     MCHC 06/11/2025 33.3     RDW 06/11/2025 12.2     Platelets 06/11/2025 187     Neutrophils % 06/11/2025 62.4     Immature Granulocytes %,* 06/11/2025 0.2     Lymphocytes % 06/11/2025 25.9     Monocytes % 06/11/2025 8.3     Eosinophils % 06/11/2025 2.7     Basophils % 06/11/2025 0.5     Neutrophils Absolute 06/11/2025 5.06     Immature Granulocytes Ab* 06/11/2025 0.02     Lymphocytes Absolute 06/11/2025 2.10     Monocytes Absolute 06/11/2025 0.67     Eosinophils Absolute 06/11/2025 0.22      Basophils Absolute 06/11/2025 0.04     Cytogenetics Interpretat* 06/11/2025                      Value:Per Hematopathology Protocol, this 6/11/2025 Bone Marrow specimen has been placed on culture and hold status.      Cultured cells will be kept until 12/20/2025.  Chromosome and FISH testing can be added to the specimen up to 180 days after receipt.  Please contact the Genetics lab at [phone] for details.     Orders Only on 05/13/2025   Component Date Value    Collection period 05/19/2025 24     Urine Volume 05/19/2025 1,850     Total Protein, Urine 05/19/2025 6     Total Protein,  24 Hour * 05/19/2025 111     Albumin % 05/19/2025 33.2     Alpha 1 Globulin % 05/19/2025 7.7     Alpha 2 Globulin % 05/19/2025 15.8     Beta Globulin % 05/19/2025 21.3     Gamma Globulin % 05/19/2025 22.0     Urine Electrophoresis Co* 05/19/2025 Normal.         Path Review-Urine Protei* 05/19/2025 Reviewed and approved by JEFFREY CALDERON on 5/22/25 at 4:12 PM.         Path Review - Urine Immu* 05/19/2025 Reviewed and approved by JEFFREY CALDERON on 5/22/25 at 4:12 PM.         Immunofixation Comment 05/19/2025 No monoclonal protein detected by immunofixation.      CBC -  Lab Results   Component Value Date    WBC 8.1 06/11/2025    HGB 13.4 (L) 06/11/2025    HCT 40.3 (L) 06/11/2025    MCV 94 06/11/2025     06/11/2025       CMP -  Lab Results   Component Value Date    CALCIUM 9.7 05/23/2025    PHOS 4.5 03/29/2024    PROT 7.0 05/23/2025    PROT 6.9 05/23/2025    ALBUMIN 4.2 05/23/2025    AST 18 05/23/2025    ALT 20 05/23/2025    ALKPHOS 39 05/23/2025    BILITOT 0.5 05/23/2025       LIPID PANEL -   Lab Results   Component Value Date    CHOL 151 06/11/2025    HDL 39.6 06/11/2025    CHHDL 3.8 06/11/2025    VLDL 44 (H) 06/11/2025    TRIG 221 (H) 06/11/2025    NHDL 111 06/11/2025       RENAL FUNCTION PANEL -   Lab Results   Component Value Date    K 4.1 05/23/2025    PHOS 4.5 03/29/2024       Lab Results   Component Value Date    BNP 20  12/12/2024    HGBA1C 6.1 (H) 06/11/2025        Assessment/Plan   PAD/claudication  Abnormal EKG/bifascicular block  Essential hypertension  Hyperlipidemia     Plan:  -I discussed results of iliofemoral CTA the patient.  Based on results proceed with angiography/intervention.    -I will continue on cilostazol 50 mg twice daily.  -I will refer to vascular rehab for supervised exercise program.  -I will continue on aspirin 81 mg daily.  -Patient already on statin therapy I will continue. .  - Patient on metoprolol and lisinopril.  Blood pressure on lower side.  I will cut down on metoprolol.  Continue lisinopril as taking.       Follow-up after testing.  In addition, the following orders were placed today:  No orders of the defined types were placed in this encounter.                SIGNATURE: Rolo Ewing MD PATIENT NAME: Cristiano Duarte   DATE/TIME: July 3, 2025 3:10 PM MRN: [id number]                                  [1]   Past Surgical History:  Procedure Laterality Date    HERNIA REPAIR  01/14/2016    Hernia Repair   [2] No family history on file.

## 2025-07-03 ENCOUNTER — TELEPHONE (OUTPATIENT)
Dept: CARDIOLOGY | Facility: HOSPITAL | Age: 80
End: 2025-07-03
Payer: COMMERCIAL

## 2025-07-03 DIAGNOSIS — I73.9 CLAUDICATION: ICD-10-CM

## 2025-07-03 DIAGNOSIS — I73.9 PAD (PERIPHERAL ARTERY DISEASE): Primary | ICD-10-CM

## 2025-07-03 DIAGNOSIS — I10 BENIGN ESSENTIAL HYPERTENSION: ICD-10-CM

## 2025-07-03 NOTE — TELEPHONE ENCOUNTER
Calling instructions for upcoming procedure: peripheral angiogram    Include review of date 07/17/2025 and arrival time 0730.     Labs up to date? Discussed with patient - labs need to be completed. Verified there are active lab orders.    For GFR less than 60, elderly, DDM, single kidney, transplanted kidney has hydration been ordered/addressed? Also provider may consider holding diuretic/ACE/ARB in these patients - review with Cath lab NP or preforming provider.    For GFR 30 or less and not on dialysis -discuss with performing provider if they want to direct admit the night before [facility] for overnight hydration    Please make sure Cath lab NP or preforming provider have been notified of any patient with a single kidney or transplanted kidney. Anti rejection medications need to be carefully considered to give/hold.    Does patient require dye prep? Do not need dye prep for shellfish allergy, only for contrast allergy. Patient does not require dye prep.    Check for medications that need to be held or adjusted:    *Hold diuretics day of cath. May give ACE/ARB hydrochlorothiazide combination medication for patients with normal renal function.    *Anticoagulation: Patient denies taking.  If on OAC for DVT/PE check for need for bridging. If on for Afib check to make sure they haven't had a cardioversion in the last 4-6 weeks.  If on Coumadin- check for indication- if on it for mechanical valve - reach out to anticoagulation clinic for bridging recommendations or check with performing provider.    *Diabetic medications:     Metformin or metformin combination medication: Patient denies taking.    Glitazones/sulfonylureas/gliptins: Patient denies taking.    Insulin: Patient denies taking.    Insulin pump- okay to wear on the way into cath lab. Pump will need to be discontinued during the heart cath. Pump can be resumed after the cath under nursing supervision. Will need a responsible adult that knows how to use machine for  24 hours after cath, because of sedation.    Continuous Glucose monitors- remove prior to coming into cath lab. If you are not due to change the CGM, patient can elect to keep it on but we are not responsible if sensor is damaged and we ask that you verify your CGM with a glucometer over the next 24 to 48 hours. If you are due to change the CGM, its best to remove and then reapply when you go home.    *PDE5 inhibitors: Cialis and Viagra Patient denies taking.    *Suboxone- check with provider, they may want anesthesia for the procedure. If they do not want anesthesia they should hold Suboxone the morning of the procedure.    Further instructions:  Nothing to eat or drink after midnight except aspirin, pletal and metoprolol with a sip of water the morning of the cath. (Okay to give calcium channel blockers, beta blockers, vasodilators, antiarrhythmics including: amiodarone, tikosyn, and sotalol)  You will need a responsible adult .   Bring your photo ID, insurance cards an accurate list of the medications you are currently taking.   Wear loose, comfortable clothing.   There is a possibility of staying over night for observation so make arrangements and pack a bag with necessities for that just incase.

## 2025-07-03 NOTE — TELEPHONE ENCOUNTER
----- Message from Kilo Daily sent at 7/3/2025  2:10 PM EDT -----  Regarding: [facility] - LE Angio  Pt scheduled for LE Angio 7/17/25    Arrive @730am  Start@830am    Needs instructions, unsure if pt needs labs    Thanks!

## 2025-07-04 LAB
CHOLEST SERPL-MCNC: 136 MG/DL
CHOLEST/HDLC SERPL: 3.9 (CALC)
EST. AVERAGE GLUCOSE BLD GHB EST-MCNC: 140 MG/DL
EST. AVERAGE GLUCOSE BLD GHB EST-SCNC: 7.7 MMOL/L
HBA1C MFR BLD: 6.5 %
HDLC SERPL-MCNC: 35 MG/DL
LDLC SERPL CALC-MCNC: 67 MG/DL (CALC)
NONHDLC SERPL-MCNC: 101 MG/DL (CALC)
TRIGL SERPL-MCNC: 245 MG/DL

## 2025-07-13 LAB
ANION GAP SERPL CALCULATED.4IONS-SCNC: 9 MMOL/L (CALC) (ref 7–17)
BUN SERPL-MCNC: 28 MG/DL (ref 7–25)
BUN/CREAT SERPL: 27 (CALC) (ref 6–22)
CALCIUM SERPL-MCNC: 8.8 MG/DL (ref 8.6–10.3)
CHLORIDE SERPL-SCNC: 106 MMOL/L (ref 98–110)
CO2 SERPL-SCNC: 26 MMOL/L (ref 20–32)
CREAT SERPL-MCNC: 1.04 MG/DL (ref 0.7–1.28)
EGFRCR SERPLBLD CKD-EPI 2021: 73 ML/MIN/1.73M2
ERYTHROCYTE [DISTWIDTH] IN BLOOD BY AUTOMATED COUNT: 12.4 % (ref 11–15)
GLUCOSE SERPL-MCNC: 100 MG/DL (ref 65–99)
HCT VFR BLD AUTO: 39.5 % (ref 38.5–50)
HGB BLD-MCNC: 13 G/DL (ref 13.2–17.1)
MCH RBC QN AUTO: 31 PG (ref 27–33)
MCHC RBC AUTO-ENTMCNC: 32.9 G/DL (ref 32–36)
MCV RBC AUTO: 94 FL (ref 80–100)
PLATELET # BLD AUTO: 208 THOUSAND/UL (ref 140–400)
PMV BLD REES-ECKER: 10.9 FL (ref 7.5–12.5)
POTASSIUM SERPL-SCNC: 4.1 MMOL/L (ref 3.5–5.3)
RBC # BLD AUTO: 4.2 MILLION/UL (ref 4.2–5.8)
SODIUM SERPL-SCNC: 141 MMOL/L (ref 135–146)
WBC # BLD AUTO: 6.8 THOUSAND/UL (ref 3.8–10.8)

## 2025-07-17 ENCOUNTER — APPOINTMENT (OUTPATIENT)
Dept: CARDIOLOGY | Facility: HOSPITAL | Age: 80
End: 2025-07-17
Payer: COMMERCIAL

## 2025-07-17 ENCOUNTER — HOSPITAL ENCOUNTER (OUTPATIENT)
Facility: HOSPITAL | Age: 80
Setting detail: OUTPATIENT SURGERY
Discharge: HOME | End: 2025-07-17
Attending: STUDENT IN AN ORGANIZED HEALTH CARE EDUCATION/TRAINING PROGRAM | Admitting: STUDENT IN AN ORGANIZED HEALTH CARE EDUCATION/TRAINING PROGRAM
Payer: COMMERCIAL

## 2025-07-17 VITALS
DIASTOLIC BLOOD PRESSURE: 91 MMHG | BODY MASS INDEX: 31.41 KG/M2 | WEIGHT: 212.08 LBS | SYSTOLIC BLOOD PRESSURE: 172 MMHG | RESPIRATION RATE: 17 BRPM | HEIGHT: 69 IN | TEMPERATURE: 97.9 F | HEART RATE: 65 BPM | OXYGEN SATURATION: 97 %

## 2025-07-17 DIAGNOSIS — I73.9 PAD (PERIPHERAL ARTERY DISEASE): Primary | ICD-10-CM

## 2025-07-17 DIAGNOSIS — I70.213 ATHEROSCLEROSIS OF NATIVE ARTERIES OF EXTREMITIES WITH INTERMITTENT CLAUDICATION, BILATERAL LEGS: ICD-10-CM

## 2025-07-17 DIAGNOSIS — I73.9 CLAUDICATION: ICD-10-CM

## 2025-07-17 LAB
ACT BLD: 211 SEC (ref 83–199)
ATRIAL RATE: 67 BPM
P AXIS: 22 DEGREES
P OFFSET: 189 MS
P ONSET: 133 MS
PR INTERVAL: 148 MS
Q ONSET: 207 MS
QRS COUNT: 11 BEATS
QRS DURATION: 152 MS
QT INTERVAL: 442 MS
QTC CALCULATION(BAZETT): 467 MS
QTC FREDERICIA: 458 MS
R AXIS: -58 DEGREES
T AXIS: -21 DEGREES
T OFFSET: 428 MS
VENTRICULAR RATE: 67 BPM

## 2025-07-17 PROCEDURE — 2500000004 HC RX 250 GENERAL PHARMACY W/ HCPCS (ALT 636 FOR OP/ED): Performed by: NURSE PRACTITIONER

## 2025-07-17 PROCEDURE — C1894 INTRO/SHEATH, NON-LASER: HCPCS | Performed by: STUDENT IN AN ORGANIZED HEALTH CARE EDUCATION/TRAINING PROGRAM

## 2025-07-17 PROCEDURE — 99153 MOD SED SAME PHYS/QHP EA: CPT | Performed by: STUDENT IN AN ORGANIZED HEALTH CARE EDUCATION/TRAINING PROGRAM

## 2025-07-17 PROCEDURE — 7100000009 HC PHASE TWO TIME - INITIAL BASE CHARGE: Performed by: STUDENT IN AN ORGANIZED HEALTH CARE EDUCATION/TRAINING PROGRAM

## 2025-07-17 PROCEDURE — 75716 ARTERY X-RAYS ARMS/LEGS: CPT | Performed by: STUDENT IN AN ORGANIZED HEALTH CARE EDUCATION/TRAINING PROGRAM

## 2025-07-17 PROCEDURE — 99152 MOD SED SAME PHYS/QHP 5/>YRS: CPT | Performed by: STUDENT IN AN ORGANIZED HEALTH CARE EDUCATION/TRAINING PROGRAM

## 2025-07-17 PROCEDURE — 93010 ELECTROCARDIOGRAM REPORT: CPT | Performed by: INTERNAL MEDICINE

## 2025-07-17 PROCEDURE — 2500000004 HC RX 250 GENERAL PHARMACY W/ HCPCS (ALT 636 FOR OP/ED): Performed by: STUDENT IN AN ORGANIZED HEALTH CARE EDUCATION/TRAINING PROGRAM

## 2025-07-17 PROCEDURE — 85347 COAGULATION TIME ACTIVATED: CPT

## 2025-07-17 PROCEDURE — C1769 GUIDE WIRE: HCPCS | Performed by: STUDENT IN AN ORGANIZED HEALTH CARE EDUCATION/TRAINING PROGRAM

## 2025-07-17 PROCEDURE — 2550000001 HC RX 255 CONTRASTS: Performed by: STUDENT IN AN ORGANIZED HEALTH CARE EDUCATION/TRAINING PROGRAM

## 2025-07-17 PROCEDURE — 7100000010 HC PHASE TWO TIME - EACH INCREMENTAL 1 MINUTE: Performed by: STUDENT IN AN ORGANIZED HEALTH CARE EDUCATION/TRAINING PROGRAM

## 2025-07-17 PROCEDURE — 36246 INS CATH ABD/L-EXT ART 2ND: CPT

## 2025-07-17 PROCEDURE — 2720000007 HC OR 272 NO HCPCS: Performed by: STUDENT IN AN ORGANIZED HEALTH CARE EDUCATION/TRAINING PROGRAM

## 2025-07-17 PROCEDURE — 93005 ELECTROCARDIOGRAM TRACING: CPT

## 2025-07-17 PROCEDURE — 2500000001 HC RX 250 WO HCPCS SELF ADMINISTERED DRUGS (ALT 637 FOR MEDICARE OP): Performed by: NURSE PRACTITIONER

## 2025-07-17 PROCEDURE — C1760 CLOSURE DEV, VASC: HCPCS | Performed by: STUDENT IN AN ORGANIZED HEALTH CARE EDUCATION/TRAINING PROGRAM

## 2025-07-17 RX ORDER — FENTANYL CITRATE 50 UG/ML
INJECTION, SOLUTION INTRAMUSCULAR; INTRAVENOUS AS NEEDED
Status: DISCONTINUED | OUTPATIENT
Start: 2025-07-17 | End: 2025-07-17 | Stop reason: HOSPADM

## 2025-07-17 RX ORDER — CILOSTAZOL 50 MG/1
50 TABLET ORAL ONCE
Status: COMPLETED | OUTPATIENT
Start: 2025-07-17 | End: 2025-07-17

## 2025-07-17 RX ORDER — IODIXANOL 320 MG/ML
INJECTION, SOLUTION INTRAVASCULAR AS NEEDED
Status: DISCONTINUED | OUTPATIENT
Start: 2025-07-17 | End: 2025-07-17 | Stop reason: HOSPADM

## 2025-07-17 RX ORDER — SODIUM CHLORIDE 9 MG/ML
100 INJECTION, SOLUTION INTRAVENOUS CONTINUOUS
Status: ACTIVE | OUTPATIENT
Start: 2025-07-17 | End: 2025-07-17

## 2025-07-17 RX ORDER — MIDAZOLAM HYDROCHLORIDE 1 MG/ML
INJECTION, SOLUTION INTRAMUSCULAR; INTRAVENOUS AS NEEDED
Status: DISCONTINUED | OUTPATIENT
Start: 2025-07-17 | End: 2025-07-17 | Stop reason: HOSPADM

## 2025-07-17 RX ORDER — SODIUM CHLORIDE 9 MG/ML
75 INJECTION, SOLUTION INTRAVENOUS CONTINUOUS
Status: CANCELLED | OUTPATIENT
Start: 2025-07-17 | End: 2025-07-17

## 2025-07-17 RX ORDER — ACETAMINOPHEN 160 MG/5ML
650 SOLUTION ORAL EVERY 6 HOURS PRN
Status: CANCELLED | OUTPATIENT
Start: 2025-07-17

## 2025-07-17 RX ORDER — SODIUM CHLORIDE 9 MG/ML
1000 INJECTION, SOLUTION INTRAVENOUS ONCE AS NEEDED
Status: CANCELLED | OUTPATIENT
Start: 2025-07-17 | End: 2025-07-17

## 2025-07-17 RX ORDER — ACETAMINOPHEN 325 MG/1
650 TABLET ORAL EVERY 6 HOURS PRN
Status: CANCELLED | OUTPATIENT
Start: 2025-07-17

## 2025-07-17 RX ORDER — LIDOCAINE HYDROCHLORIDE 20 MG/ML
INJECTION, SOLUTION INFILTRATION; PERINEURAL AS NEEDED
Status: DISCONTINUED | OUTPATIENT
Start: 2025-07-17 | End: 2025-07-17 | Stop reason: HOSPADM

## 2025-07-17 RX ORDER — ACETAMINOPHEN 650 MG/1
650 SUPPOSITORY RECTAL EVERY 6 HOURS PRN
Status: CANCELLED | OUTPATIENT
Start: 2025-07-17

## 2025-07-17 RX ORDER — HEPARIN SODIUM 1000 [USP'U]/ML
INJECTION, SOLUTION INTRAVENOUS; SUBCUTANEOUS AS NEEDED
Status: DISCONTINUED | OUTPATIENT
Start: 2025-07-17 | End: 2025-07-17 | Stop reason: HOSPADM

## 2025-07-17 RX ORDER — MORPHINE SULFATE 2 MG/ML
2 INJECTION, SOLUTION INTRAMUSCULAR; INTRAVENOUS EVERY 6 HOURS PRN
Refills: 0 | Status: CANCELLED | OUTPATIENT
Start: 2025-07-17

## 2025-07-17 RX ADMIN — CILOSTAZOL 50 MG: 50 TABLET ORAL at 08:54

## 2025-07-17 RX ADMIN — SODIUM CHLORIDE 100 ML/HR: 9 INJECTION, SOLUTION INTRAVENOUS at 08:15

## 2025-07-17 ASSESSMENT — PAIN - FUNCTIONAL ASSESSMENT

## 2025-07-17 ASSESSMENT — PAIN SCALES - GENERAL

## 2025-07-17 ASSESSMENT — COLUMBIA-SUICIDE SEVERITY RATING SCALE - C-SSRS
6. HAVE YOU EVER DONE ANYTHING, STARTED TO DO ANYTHING, OR PREPARED TO DO ANYTHING TO END YOUR LIFE?: NO
1. IN THE PAST MONTH, HAVE YOU WISHED YOU WERE DEAD OR WISHED YOU COULD GO TO SLEEP AND NOT WAKE UP?: NO
2. HAVE YOU ACTUALLY HAD ANY THOUGHTS OF KILLING YOURSELF?: NO

## 2025-07-17 NOTE — POST-PROCEDURE NOTE
Physician Transition of Care Summary  Invasive Cardiovascular Lab    Procedure Date: 7/17/2025  Attending:    Beryl Ewing - Primary  Resident/Fellow/Other Assistant: Surgeons and Role:  * No surgeons found with a matching role *    Indications:   Pre-op Diagnosis      * PAD (peripheral artery disease) [I73.9]     * Claudication [I73.9]    Post-procedure diagnosis:   Post-op Diagnosis     * PAD (peripheral artery disease) [I73.9]     * Claudication [I73.9]    Procedure(s):   Lower Extremity Angiogram  29855 - CHG ANGIOGRAPHY EXTREMITY UNILATERAL RS&I        Procedure Findings:   Severe PAD   Please see full cath report     Description of the Procedure:   Peripheral angiogram   Rt radial artery 6 [place]     Complications:   None     Stents/Implants:   Implants       No implant documentation for this case.            Anticoagulation/Antiplatelet Plan:   Continue aspirin and pletal     Estimated Blood Loss:   10 mL    Anesthesia: Moderate Sedation Anesthesia Staff: No anesthesia staff entered.    Any Specimen(s) Removed:   No specimens collected during this procedure.    Disposition:   Recovery and home today      Electronically signed by: NEISHA Taylor, 7/17/2025 11:26 AM

## 2025-07-17 NOTE — DISCHARGE INSTRUCTIONS
If you have any questions, please call the Cath Lab Nurse Practitioner Suadbehzad Angel at [phone] and leave a message. She will return your call the same day if calling before 3 PM M-F. If you call on the weekend you can expect a call back on Monday morning. You may also call the Cath Lab at [phone] M-F, 7-3:30 with any questions. Weekends and after hours please call your cardiologist office number [phone] to reach a physician on call.    PERIPHERAL ANGIOGRAPHY DISCHARGE INSTRUCTIONS    FOR SUDDEN AND SEVERE CHEST PAIN, SHORTNESS OF BREATH, EXCESSIVE BLEEDING, SIGNS OF STROKE, OR CHANGES IN MENTAL STATUS YOU SHOULD CALL 911 IMMEDIATELY.     If your provider has prescribed aspirin and/or clopidogrel (Plavix), or prasugrel (Effient), or ticagrelor (Brilinta), DO NOT STOP THESE MEDICATIONS for any reason without talking to your cardiologist first. If any of these were prescribed, you must take them every day without missing a single dose. If you are getting low on these medications, contact your provider immediately for a refill.     FOR NEXT 24 HOURS  - Upon discharge, you should return home and rest for the remainder of the day and evening. You do not have to stay on bed rest but should not be very active.  It is recommended a responsible adult be with you for the first 24 hours after the procedure.    - No driving for 24 hours after procedure. Please arrange for someone to drive you home from the hospital today.     - Do not drive, operate machinery, or use power tools for 24 hours after your procedure.     - Do not make any legal decisions for 24 hours after your procedure.     - Do not drink alcoholic beverages for 24 hours after your procedure.    WOUND CARE   *FOR FEMORAL (LEG) ACCESS*  ·      Avoid heavy lifting (over 10 pounds) for 7 days, squatting or excessive bending for 2 days, and strenuous exercise for 7 days.  ·      No submerged bathing, swimming, or hot tubs for the next 7 days, or until  fully healed.  ·      Avoid sexual activity for 3-4 days until any groin discomfort has ceased.     *FOR RADIAL (WRIST) ACCESS*  ·      No lifting more than 5 pounds or excessive use of the wrist for 24 hours - for example, treat your wrist as if it is sprained.  ·      Do not engage in vigorous activities (tennis, golf, bowling, weights) for at least 48 hours after the procedure.  ·      Do not submerge the wrist for 7 days after the procedure.  ·      You should expect mild tingling in your hand and tenderness at the puncture site for up to 3 days.    - The transparent dressing should be removed from the site 24 hours after the procedure.  Wash the site gently with soap and water. Rinse well and pat dry. Keep the area clean and dry. You may apply a Band-Aid to the site. Avoid lotions, ointments, or powders until fully healed.     - You may shower the day after your procedure.      - It is normal to notice a small bruise around the puncture site and/or a small grape sized or smaller lump. Any large bruising or large lump warrants a call to the office.     - If bleeding should occur, lay down and apply pressure to the affected area for 10 minutes.  If the bleeding stops notify your physician.  If there is a large amount of bleeding or spurting of blood CALL 911 immediately.  DO NOT drive yourself to the hospital.    - You may experience some tenderness, bruising or minimal inflammation.  If you have any concerns, you may contact the Cath Lab or if any of these symptoms become excessive, contact your cardiologist or go to the emergency room.     OTHER INSTRUCTIONS  - You may take acetaminophen (Tylenol) as directed for discomfort.  If pain is not relieved with acetaminophen (Tylenol), contact your doctor.    - It can be normal to have slight swelling in the leg the procedure was performed on (not the puncture site leg) post-procedure. Elevate the leg as much as possible above the level of your heart. Any excessive  swelling, warmth or redness to the leg warrants a call to the office.    - If you notice or experience any of the following, you should notify your doctor or seek medical attention  Chest pain or discomfort  Change in mental status or weakness in extremities.  Dizziness, light headedness, or feeling faint.  Change in the site where the procedure was performed, such as bleeding or an increased area of bruising or swelling.  Tingling, numbness, pain, or coolness in the leg/arm beyond the site where the procedure was performed.  Signs of infection (i.e. shaking chills, temperature > 100 degrees Fahrenheit, warmth, redness) in the leg/arm area where the procedure was performed.  Changes in urination   Bloody or black stools  Vomiting blood  Severe nose bleeds

## 2025-07-17 NOTE — Clinical Note
Vessel(s): right CFA and right dorsalis pedis artery. Injected with hand injections. Multiple views taken.

## 2025-07-17 NOTE — Clinical Note
Vessel(s): left CFA and left dorsalis pedis artery. Injected with hand injections. Multiple views taken.

## 2025-07-17 NOTE — Clinical Note
Closure device placed in the right radial artery. Site closed by radial compression system. Deployed By: Brant Quiñones

## 2025-07-22 ENCOUNTER — OFFICE VISIT (OUTPATIENT)
Dept: VASCULAR SURGERY | Facility: HOSPITAL | Age: 80
End: 2025-07-22
Payer: COMMERCIAL

## 2025-07-22 VITALS
SYSTOLIC BLOOD PRESSURE: 190 MMHG | BODY MASS INDEX: 32.36 KG/M2 | WEIGHT: 216 LBS | DIASTOLIC BLOOD PRESSURE: 95 MMHG | HEART RATE: 83 BPM

## 2025-07-22 DIAGNOSIS — I70.213 ATHEROSCLEROSIS OF NATIVE ARTERIES OF EXTREMITIES WITH INTERMITTENT CLAUDICATION, BILATERAL LEGS: Primary | ICD-10-CM

## 2025-07-22 PROCEDURE — 3080F DIAST BP >= 90 MM HG: CPT | Performed by: SURGERY

## 2025-07-22 PROCEDURE — 3077F SYST BP >= 140 MM HG: CPT | Performed by: SURGERY

## 2025-07-22 PROCEDURE — 1159F MED LIST DOCD IN RCRD: CPT | Performed by: SURGERY

## 2025-07-22 PROCEDURE — 1160F RVW MEDS BY RX/DR IN RCRD: CPT | Performed by: SURGERY

## 2025-07-22 PROCEDURE — 99215 OFFICE O/P EST HI 40 MIN: CPT | Performed by: SURGERY

## 2025-07-22 PROCEDURE — 99205 OFFICE O/P NEW HI 60 MIN: CPT | Performed by: SURGERY

## 2025-07-22 NOTE — PROGRESS NOTES
Vascular Surgery Clinic Note    CC: BL LE CLTI 3     HPI:  Cristiano Duarte is 79 y.o. 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 Ewing for surgical revascularization. Patient reports short distance bilateral lower extremity claudication which is refractory to medical management. He is a previous smoker, and is compliant with his aspirin, statin, and pletal. Both legs are equally affected. No rest pain but does endorse some numbness. No tissue loss. He does still ambulate and work. No history of MI or stroke but has been told in the past he has CAD.    Past Vascular History:  7/17/25 ([facility]) - BL LE diagnostic angiography    Past Vascular Testing:  CTA a/p with runoff (6/24/25)  PVR (4/23/25) - R 0.67 (0.22), L 0.58 (0.19)    Medical History:  Problem List[1]     Meds:   Medications Ordered Prior to Encounter[2]     Allergies:   RX Allergies[3]    SH:    Social Drivers of Health     Tobacco Use: Medium Risk (7/22/2025)    Patient History     Smoking Tobacco Use: Former     Smokeless Tobacco Use: Former     Passive Exposure: Not on file   Alcohol Use: Not At Risk (12/12/2024)    AUDIT-C     Frequency of Alcohol Consumption: Never     Average Number of Drinks: Patient does not drink     Frequency of Binge Drinking: Never   Financial Resource Strain: Low Risk  (12/13/2024)    Overall Financial Resource Strain (CARDIA)     Difficulty of Paying Living Expenses: Not hard at all   Food Insecurity: No Food Insecurity (12/12/2024)    Hunger Vital Sign     Worried About Running Out of Food in the Last Year: Never true     Ran Out of Food in the Last Year: Never true   Transportation Needs: No Transportation Needs (12/13/2024)    PRAPARE - Transportation     Lack of Transportation (Medical): No     Lack of Transportation (Non-Medical): No   Physical Activity: Not on file   Stress: Not on file   Social Connections: Not on file   Intimate Partner Violence: Not At  Risk (12/12/2024)    Humiliation, Afraid, Rape, and Kick questionnaire     Fear of Current or Ex-Partner: No     Emotionally Abused: No     Physically Abused: No     Sexually Abused: No   Depression: Not at risk (1/24/2025)    Received from [facility]    PHQ-2     PHQ-2 score: 0   Housing Stability: Low Risk  (12/13/2024)    Housing Stability Vital Sign     Unable to Pay for Housing in the Last Year: No     Number of Times Moved in the Last Year: 0     Homeless in the Last Year: No   Utilities: Not At Risk (12/12/2024)    [facility] Utilities     Threatened with loss of utilities: No   Digital Equity: Not on file   Health Literacy: Not on file        FH:  Family History[4]     ROS:  All systems were reviewed and are negative except as per HPI.    Objective:  Vitals:  Vitals:    07/22/25 0957   BP: (!) 190/95   Pulse: 83        Exam:  Constitutional: normal, well appearing  HEENT: normocephalic  CV: regular rate  RESP: symmetric expansion, unlabored breathing  GI: soft, nontender, nondistended  MSK: normal ROM  INT: no lesions  PSYCH: appropriate mood  NEURO: no deficits  VASC: no foot wounds    Assessment & Plan:  Cristiano Duarte is 79 y.o. male with BL LE CLTI 3     - Reviewed angiography results with patient which are significant for right CFA stenosis and SFA occlusion, left CFA occlusion, left iliac stenosis, and left SFA short occlusion  - Both legs will likely need revascularization but we discussed that concomitant BL fem endart will be a longer procedure and recovery, and I recommend staging. He is agreeable  - Will proceed with left femoral endarterectomy, left BERNARD stent, left SFA angioplasty, possible stent tentative date 9/5  - Did discuss that he will need cardiac risk stratification prior to surgery and will reach back out to Dr. Ewing  - Will need to hold pletal 48 hrs prior to surgery      Meds  - ASA 81 mg  - Pletal 50 mg  - Atorvastatin 20 mg    Screening/Surveillance      Next Followup  - Schedule  intervention    Celeste Castaneda MD                 [1]   Patient Active Problem List  Diagnosis    Benign essential hypertension    Benign prostatic hyperplasia    Acute urinary retention    Coagulopathy (Multi)    Hematuria    Hyperlipidemia    Malignant melanoma of other part of trunk    Scar condition and fibrosis of skin    Seizure (Multi)    Shortness of breath    TIA (transient ischemic attack)    PAD (peripheral artery disease)    Claudication   [2]   Current Outpatient Medications on File Prior to Visit   Medication Sig Dispense Refill    aspirin 81 mg chewable tablet Chew 1 tablet (81 mg) once daily. 30 tablet 11    atorvastatin (Lipitor) 20 mg tablet Take 1 tablet (20 mg) by mouth once daily at bedtime.      cilostazol (Pletal) 50 mg tablet Take 1 tablet (50 mg) by mouth 2 times a day. 60 tablet 11    doxazosin (Cardura) 4 mg tablet Take 1 tablet (4 mg) by mouth once daily at bedtime.      finasteride (Proscar) 5 mg tablet Take 1 tablet (5 mg) by mouth early in the morning..      gabapentin (Neurontin) 300 mg capsule Take 1 capsule (300 mg) by mouth 3 times a day. 30 capsule 3    lisinopril 5 mg tablet Take 1 tablet (5 mg) by mouth once daily.      metoprolol succinate XL (Toprol-XL) 25 mg 24 hr tablet Take 1 tablet (25 mg) by mouth once daily. 30 tablet 11    tamsulosin (Flomax) 0.4 mg 24 hr capsule Take 1 capsule (0.4 mg) by mouth once daily.       No current facility-administered medications on file prior to visit.   [3] No Known Allergies  [4]   Family History  Problem Relation Name Age of Onset    Cancer Father Jonas cano     Cancer Sister Mikcie cano

## 2025-08-04 ENCOUNTER — TELEPHONE (OUTPATIENT)
Dept: CARDIOLOGY | Facility: HOSPITAL | Age: 80
End: 2025-08-04
Payer: COMMERCIAL

## 2025-08-07 ENCOUNTER — OFFICE VISIT (OUTPATIENT)
Dept: CARDIOLOGY | Facility: HOSPITAL | Age: 80
End: 2025-08-07
Payer: COMMERCIAL

## 2025-08-07 VITALS
SYSTOLIC BLOOD PRESSURE: 98 MMHG | BODY MASS INDEX: 31.99 KG/M2 | DIASTOLIC BLOOD PRESSURE: 56 MMHG | HEIGHT: 69 IN | WEIGHT: 216 LBS | HEART RATE: 96 BPM | OXYGEN SATURATION: 95 %

## 2025-08-07 DIAGNOSIS — E78.5 HYPERLIPIDEMIA, UNSPECIFIED HYPERLIPIDEMIA TYPE: ICD-10-CM

## 2025-08-07 DIAGNOSIS — I73.9 PAD (PERIPHERAL ARTERY DISEASE): ICD-10-CM

## 2025-08-07 DIAGNOSIS — I10 BENIGN ESSENTIAL HYPERTENSION: ICD-10-CM

## 2025-08-07 DIAGNOSIS — R94.31 ABNORMAL ELECTROCARDIOGRAM (ECG) (EKG): ICD-10-CM

## 2025-08-07 DIAGNOSIS — Z01.818 PRE-OPERATIVE CLEARANCE: Primary | ICD-10-CM

## 2025-08-07 PROCEDURE — 3078F DIAST BP <80 MM HG: CPT | Performed by: STUDENT IN AN ORGANIZED HEALTH CARE EDUCATION/TRAINING PROGRAM

## 2025-08-07 PROCEDURE — 1159F MED LIST DOCD IN RCRD: CPT | Performed by: STUDENT IN AN ORGANIZED HEALTH CARE EDUCATION/TRAINING PROGRAM

## 2025-08-07 PROCEDURE — 99212 OFFICE O/P EST SF 10 MIN: CPT | Performed by: STUDENT IN AN ORGANIZED HEALTH CARE EDUCATION/TRAINING PROGRAM

## 2025-08-07 PROCEDURE — 99215 OFFICE O/P EST HI 40 MIN: CPT | Performed by: STUDENT IN AN ORGANIZED HEALTH CARE EDUCATION/TRAINING PROGRAM

## 2025-08-07 PROCEDURE — 3074F SYST BP LT 130 MM HG: CPT | Performed by: STUDENT IN AN ORGANIZED HEALTH CARE EDUCATION/TRAINING PROGRAM

## 2025-08-07 RX ORDER — LISINOPRIL 5 MG/1
5 TABLET ORAL
Qty: 30 TABLET | Refills: 11 | Status: SHIPPED | OUTPATIENT
Start: 2025-08-07 | End: 2026-08-07

## 2025-08-07 RX ORDER — LISINOPRIL 5 MG/1
2.5 TABLET ORAL
Qty: 15 TABLET | Refills: 11 | Status: SHIPPED | OUTPATIENT
Start: 2025-08-07 | End: 2025-08-07 | Stop reason: SDUPTHER

## 2025-08-07 NOTE — PROGRESS NOTES
[facility] Vascular Cardiology Clinic Note    Date: 08/07/25  Time: 4:45 PM    Subjective   Cristiano Duarte is a 79 y.o. male who is coming to cardiac clinic for follow up care.  Patient was referred to me by Dr. Tobias Edwards MD. Patient has history of bifascicular block, MGUS, hypertension, hyperlipidemia, TIA.  Patient was referred to me for evaluation of PAD.  Patient was having claudication symptoms.  His CHI was performed which showed moderate disease bilaterally.  Subsequently he had angiogram which showed severe bilateral PAD.  He is referred to vascular surgery.  Patient is planned for left femoral endarterectomy, left BERNARD stent, left SFA angioplasty.  Subsequently he will need intervention on right common femoral.  He is here for cardiac risk stratification.     He denies any chest pain or dyspnea.  Has been able to do greater than 4 METS.  Can walk half a block without problems.  Can use push mower without anginal symptoms.  He had a stress test in 2023 which was unremarkable for ischemia.      Does push mower withoout probloems   Smoking- past smoker, quit when he was 71 yrs ago   Alcohol- past   Illicit drug-denies  Family history-noncontributory       Review of Systems:  Otherwise, limited cardiovascular review of systems is negative.        Medical History:   He has a past medical history of Cataract (2 years ago) and Essential (primary) hypertension (12/08/2013).  Surgical History:   Surgical History[1]PSHP@  Social History:   Social Drivers of Health with Concerns     Tobacco Use: Medium Risk (8/7/2025)    Patient History     Smoking Tobacco Use: Former     Smokeless Tobacco Use: Former     Passive Exposure: Not on file   Physical Activity: Not on file   Stress: Not on file   Social Connections: Not on file   Digital Equity: Not on file   Health Literacy: Not on file     Family History:   Family History[2]   Allergies:  Patient has no known allergies.    Outpatient  "Medications:  Current Outpatient Medications   Medication Instructions    aspirin 81 mg, oral, Daily    atorvastatin (LIPITOR) 20 mg, Nightly    cilostazol (PLETAL) 50 mg, oral, 2 times daily    doxazosin (Cardura) 4 mg tablet 1 tablet, Nightly    finasteride (Proscar) 5 mg tablet 1 tablet, Daily (0630)    gabapentin (NEURONTIN) 300 mg, oral, 3 times daily    lisinopril 5 mg, oral, Daily RT    metoprolol succinate XL (TOPROL-XL) 25 mg, oral, Daily    tamsulosin (FLOMAX) 0.4 mg, Daily       Objective     Physical Exam  Vitals:    08/07/25 1452   BP: 98/56   BP Location: Left arm   Patient Position: Sitting   BP Cuff Size: Adult   Pulse: 96   SpO2: 95%   Weight: 98 kg (216 lb)   Height: 1.74 m (5' 8.5\")     Wt Readings from Last 3 Encounters:   08/07/25 98 kg (216 lb)   07/22/25 98 kg (216 lb)   07/17/25 96.2 kg (212 lb 1.3 oz)         General: Alert and Oriented, No distress, cooperative  Head: Normocephalic without obvious abnormality, atraumatic  Eyes: Conjunctiva/corneas clear, EOM's grossly intact  Neck: Supple, trachea midline, No thyroid enlargement/tenderness/nodules; No JVD  Lungs: Clear to auscultation bilaterally, no wheezes, rhonci, or rales. respirations unlabored  Chest Wall: No tenderness or deformity  Heart: Regular rhythm, normal S1/S2, 2/6 ejection systolic murmur at RUSB.  Abdomen: Soft, non-tender, Non-distended, bowel sounds active  Extremities: No edema, no cyanosis, no clubbing  Skin: Skin color, texture, turgor normal.  No rashes or lesions noted  Neurologic: Alert and oriented x 3, grossly moving all extremities, speech intact           I have personally reviewed the following images and laboratory findings:  ECG: see scanned   Echocardiogram: not done    STUDY:  CARDIAC STRESS/REST INJECTION; CARDIAC STRESS/REST (MYOCARDIAL  PERFUSION/MIBI); PART 2 STRESS OR REST (NO CHARGE);  5/22/2023 1:52  pm; 5/22/2023 1:53 pm; 5/22/2023 1:54 pm     INDICATION:  I10 Essential (primary) hypertension   " I51.7 Cardiomegaly   R06.00  Dyspnea, unspecified  .     COMPARISON:  None.     ACCESSION NUMBER(S):  [id number]; [id number]; [id number]     ORDERING CLINICIAN:  LM ALLAN     TECHNIQUE:  DIVISION OF NUCLEAR MEDICINE  STRESS MYOCARDIAL PERFUSION SCAN, ONE DAY PROTOCOL     The patient received an intravenous injection of 10.2 mCi of Tc-99m  Myoview and resting emission tomographic (SPECT) images of the  myocardium were acquired. The patient then underwent treadmill stress  exercising to 91 % of MPHR and achieving 5.3 METS.  At peak stress an  additional injection of 31.6 mCi of Tc-99m Myoview was administered  and stress phase SPECT images of the myocardium were then acquired.  This acquisition included ECG-gated images to assess and quantify  ventricular function. Low dose CT was acquired for attenuation  correction.     FINDINGS:  Both stress and rest studies demonstrate grossly normal perfusion  throughout the left ventricle.     The left ventricle is normal in size.     Gated images demonstrate normal LV wall motion with a post-stress LV  EF estimated at 61%.     Attenuation correction CT images are nondiagnostic.     IMPRESSION:  1. Negative myocardial perfusion study without evidence of inducible  myocardial ischemia or prior infarction.  2. The left ventricle is normal in size.  3. Normal LV wall motion with a post-stress LV EF estimated at 61%.  Laboratory values:   Admission on 07/17/2025, Discharged on 07/17/2025   Component Date Value    Ventricular Rate 07/17/2025 67     Atrial Rate 07/17/2025 67     AK Interval 07/17/2025 148     QRS Duration 07/17/2025 152     QT Interval 07/17/2025 442     QTC Calculation(Bazett) 07/17/2025 467     P Axis 07/17/2025 22     R Axis 07/17/2025 -58     T Axis 07/17/2025 -21     QRS Count 07/17/2025 11     Q Onset 07/17/2025 207     P Onset 07/17/2025 133     P Offset 07/17/2025 189     T Offset 07/17/2025 428     QTC Fredericia 07/17/2025 458     POCT Activated Clotting *  07/17/2025 211 (H)    Telephone on 07/03/2025   Component Date Value    GLUCOSE 07/12/2025 100 (H)     UREA NITROGEN (BUN) 07/12/2025 28 (H)     CREATININE 07/12/2025 1.04     EGFR 07/12/2025 73     BUN/CREATININE RATIO 07/12/2025 27 (H)     SODIUM 07/12/2025 141     POTASSIUM 07/12/2025 4.1     CHLORIDE 07/12/2025 106     CARBON DIOXIDE 07/12/2025 26     ELECTROLYTE BALANCE 07/12/2025 9     CALCIUM 07/12/2025 8.8     WHITE BLOOD CELL COUNT 07/12/2025 6.8     RED BLOOD CELL COUNT 07/12/2025 4.20     HEMOGLOBIN 07/12/2025 13.0 (L)     HEMATOCRIT 07/12/2025 39.5     MCV 07/12/2025 94.0     MCH 07/12/2025 31.0     MCHC 07/12/2025 32.9     RDW 07/12/2025 12.4     PLATELET COUNT 07/12/2025 208     MPV 07/12/2025 10.9    Lab on 06/11/2025   Component Date Value    Scan Result 06/11/2025 See Scanned Result     Comments 06/11/2025                      Value:Report has been scanned to the patient's chart.     CBC -  Lab Results   Component Value Date    WBC 6.8 07/12/2025    HGB 13.0 (L) 07/12/2025    HCT 39.5 07/12/2025    MCV 94.0 07/12/2025     07/12/2025       CMP -  Lab Results   Component Value Date    CALCIUM 8.8 07/12/2025    PHOS 4.5 03/29/2024    PROT 7.0 05/23/2025    PROT 6.9 05/23/2025    ALBUMIN 4.2 05/23/2025    AST 18 05/23/2025    ALT 20 05/23/2025    ALKPHOS 39 05/23/2025    BILITOT 0.5 05/23/2025       LIPID PANEL -   Lab Results   Component Value Date    CHOL 136 07/03/2025    HDL 35 (L) 07/03/2025    CHHDL 3.9 07/03/2025    VLDL 44 (H) 06/11/2025    TRIG 245 (H) 07/03/2025    NHDL 101 07/03/2025       RENAL FUNCTION PANEL -   Lab Results   Component Value Date    K 4.1 07/12/2025    PHOS 4.5 03/29/2024       Lab Results   Component Value Date    BNP 20 12/12/2024    HGBA1C 6.5 (H) 07/03/2025        Assessment/Plan   PAD/claudication  Abnormal EKG/bifascicular block  Essential hypertension  Hyperlipidemia  Preoperative risk stratification.     Plan:  -I will continue on aspirin, cilostazol, statin  at this time.    -Blood pressure on lower side I will cut down to lisinopril 5 mg daily.  -- Patient on metoprolol low-dose, will continue  - I will obtain TTE to rule out structural abnormality/valvular heart disease.  -Based on RCRI risk alkylator patient is at 1.1 % Risk of major cardiac event.  If no significant structural abnormalities on echocardiogram then patient can proceed with the surgical procedure at about risk.     RTC as scheduled    In addition, the following orders were placed today:  Orders Placed This Encounter   Procedures    Transthoracic echo (TTE) complete                 SIGNATURE: Rolo Ewing MD PATIENT NAME: Cristiano Duarte   DATE/TIME: August 7, 2025 4:45 PM MRN: [id number]                                  [1]   Past Surgical History:  Procedure Laterality Date    HERNIA REPAIR  01/14/2016    Hernia Repair    INVASIVE VASCULAR PROCEDURE Bilateral 7/17/2025    Procedure: Lower Extremity Angiogram;  Surgeon: Rolo Ewing MD;  Location: [facility] Cardiac Cath Lab;  Service: Cardiovascular;  Laterality: Bilateral;   [2]   Family History  Problem Relation Name Age of Onset    Cancer Father Jonas duarte     Cancer Sister Mickie duarte

## 2025-08-08 ENCOUNTER — TELEPHONE (OUTPATIENT)
Dept: CARDIOLOGY | Facility: HOSPITAL | Age: 80
End: 2025-08-08
Payer: COMMERCIAL

## 2025-08-08 NOTE — TELEPHONE ENCOUNTER
Pharmacy called and left vm stating that they received two prescriptions for Lisinopril - returned phone call to the pharmacy to clarify that this is 1 tablet by mouth daily. Other script has been discontinued

## 2025-08-22 ENCOUNTER — HOSPITAL ENCOUNTER (OUTPATIENT)
Dept: CARDIOLOGY | Facility: HOSPITAL | Age: 80
Discharge: HOME | End: 2025-08-22
Payer: COMMERCIAL

## 2025-08-22 DIAGNOSIS — R94.31 ABNORMAL ELECTROCARDIOGRAM (ECG) (EKG): ICD-10-CM

## 2025-08-22 DIAGNOSIS — I10 BENIGN ESSENTIAL HYPERTENSION: ICD-10-CM

## 2025-08-22 PROCEDURE — C8929 TTE W OR WO FOL WCON,DOPPLER: HCPCS

## 2025-08-22 PROCEDURE — 2500000004 HC RX 250 GENERAL PHARMACY W/ HCPCS (ALT 636 FOR OP/ED): Mod: JW | Performed by: STUDENT IN AN ORGANIZED HEALTH CARE EDUCATION/TRAINING PROGRAM

## 2025-08-22 PROCEDURE — 93306 TTE W/DOPPLER COMPLETE: CPT | Performed by: STUDENT IN AN ORGANIZED HEALTH CARE EDUCATION/TRAINING PROGRAM

## 2025-08-22 RX ADMIN — HUMAN ALBUMIN MICROSPHERES AND PERFLUTREN 0.5 ML: 10; .22 INJECTION, SOLUTION INTRAVENOUS at 15:47

## 2025-08-23 LAB
AORTIC VALVE MEAN GRADIENT: 13 MMHG
AORTIC VALVE PEAK VELOCITY: 2.34 M/S
AV PEAK GRADIENT: 22 MMHG
AVA (PEAK VEL): 2.11 CM2
AVA (VTI): 1.95 CM2
EJECTION FRACTION APICAL 4 CHAMBER: 52.1
EJECTION FRACTION: 53 %
LEFT ATRIUM VOLUME AREA LENGTH INDEX BSA: 32.2 ML/M2
LEFT VENTRICLE INTERNAL DIMENSION DIASTOLE: 4.2 CM (ref 3.5–6)
LEFT VENTRICULAR OUTFLOW TRACT DIAMETER: 2 CM
LV EJECTION FRACTION BIPLANE: 55 %
MITRAL VALVE E/A RATIO: 0.58
MITRAL VALVE E/E' RATIO: 11.2
RIGHT VENTRICLE PEAK SYSTOLIC PRESSURE: 22 MMHG
TRICUSPID ANNULAR PLANE SYSTOLIC EXCURSION: 3.1 CM

## 2025-08-25 ENCOUNTER — RESULTS FOLLOW-UP (OUTPATIENT)
Dept: CARDIOLOGY | Facility: HOSPITAL | Age: 80
End: 2025-08-25
Payer: COMMERCIAL

## 2025-08-25 DIAGNOSIS — R94.31 ABNORMAL ELECTROCARDIOGRAM (ECG) (EKG): Primary | ICD-10-CM

## 2025-08-25 DIAGNOSIS — R93.1 ABNORMAL ECHOCARDIOGRAM: ICD-10-CM

## 2025-08-26 ENCOUNTER — TELEPHONE (OUTPATIENT)
Dept: CARDIOLOGY | Facility: HOSPITAL | Age: 80
End: 2025-08-26
Payer: COMMERCIAL

## 2025-09-02 ENCOUNTER — TELEPHONE (OUTPATIENT)
Dept: CARDIOLOGY | Facility: HOSPITAL | Age: 80
End: 2025-09-02
Payer: COMMERCIAL

## 2025-09-04 ENCOUNTER — OFFICE VISIT (OUTPATIENT)
Dept: OPHTHALMOLOGY | Facility: CLINIC | Age: 80
End: 2025-09-04
Payer: COMMERCIAL

## 2025-09-04 DIAGNOSIS — H53.9 VISION DISTURBANCE: ICD-10-CM

## 2025-09-04 DIAGNOSIS — H35.3231 EXUDATIVE AGE-RELATED MACULAR DEGENERATION OF BOTH EYES WITH ACTIVE CHOROIDAL NEOVASCULARIZATION: Primary | ICD-10-CM

## 2025-09-04 PROCEDURE — 92134 CPTRZ OPH DX IMG PST SGM RTA: CPT

## 2025-09-04 PROCEDURE — 99204 OFFICE O/P NEW MOD 45 MIN: CPT

## 2025-09-04 PROCEDURE — 67028 INJECTION EYE DRUG: CPT | Mod: BILATERAL PROCEDURE

## 2025-09-04 ASSESSMENT — TONOMETRY
OD_IOP_MMHG: 31
IOP_METHOD: GOLDMANN APPLANATION
OD_IOP_MMHG: 32
IOP_METHOD: GOLDMANN APPLANATION
OS_IOP_MMHG: 30

## 2025-09-04 ASSESSMENT — EXTERNAL EXAM - LEFT EYE: OS_EXAM: NORMAL

## 2025-09-04 ASSESSMENT — CONF VISUAL FIELD
OS_INFERIOR_NASAL_RESTRICTION: 3
OD_SUPERIOR_TEMPORAL_RESTRICTION: 0
OD_INFERIOR_TEMPORAL_RESTRICTION: 0
OD_SUPERIOR_NASAL_RESTRICTION: 0
OD_NORMAL: 1
OD_INFERIOR_NASAL_RESTRICTION: 0
METHOD: COUNTING FINGERS

## 2025-09-04 ASSESSMENT — ENCOUNTER SYMPTOMS
RESPIRATORY NEGATIVE: 0
CARDIOVASCULAR NEGATIVE: 0
PSYCHIATRIC NEGATIVE: 0
GASTROINTESTINAL NEGATIVE: 0
HEMATOLOGIC/LYMPHATIC NEGATIVE: 0
NEUROLOGICAL NEGATIVE: 0
EYES NEGATIVE: 1
MUSCULOSKELETAL NEGATIVE: 0
ALLERGIC/IMMUNOLOGIC NEGATIVE: 0
CONSTITUTIONAL NEGATIVE: 0
ENDOCRINE NEGATIVE: 0

## 2025-09-04 ASSESSMENT — VISUAL ACUITY
OD_CC: 20/60
CORRECTION_TYPE: GLASSES
OS_CC: 20/50
OD_CC+: -2
METHOD: SNELLEN - LINEAR
OS_CC+: -1

## 2025-09-04 ASSESSMENT — GONIOSCOPY
OS_SUPERIOR: TM
OS_INFERIOR: SS
OD_TEMPORAL: SS
OS_TEMPORAL: SS
OS_NASAL: SS
OD_NASAL: TM
OD_SUPERIOR: TM
OD_INFERIOR: CBB

## 2025-09-04 ASSESSMENT — SLIT LAMP EXAM - LIDS
COMMENTS: NORMAL
COMMENTS: NORMAL

## 2025-09-04 ASSESSMENT — EXTERNAL EXAM - RIGHT EYE: OD_EXAM: NORMAL

## 2025-09-05 ENCOUNTER — APPOINTMENT (OUTPATIENT)
Dept: NEUROLOGY | Facility: CLINIC | Age: 80
End: 2025-09-05
Payer: COMMERCIAL

## 2025-10-02 ENCOUNTER — APPOINTMENT (OUTPATIENT)
Dept: OPHTHALMOLOGY | Facility: CLINIC | Age: 80
End: 2025-10-02
Payer: COMMERCIAL

## 2025-11-21 ENCOUNTER — APPOINTMENT (OUTPATIENT)
Dept: NEUROLOGY | Facility: CLINIC | Age: 80
End: 2025-11-21
Payer: COMMERCIAL

## (undated) DEVICE — CATHETER, ANGIO, IMPULSE, MPA2, 5 FR X 125 CM

## (undated) DEVICE — SHEATH, GLIDESHEATH, SLENDER, 6FR 10CM

## (undated) DEVICE — GUIDE WIRE, 260CM, HI-TORQUE, VERSACORE, MODIFIED J

## (undated) DEVICE — 6FR DXTERITY JR 4.0 DIAGNOSTIC CATHETER, .038 125 CM RADIAL

## (undated) DEVICE — GUIDEWIRE, ANGLE TIP,  .035 DIA, 260 CM, 3 CM TIP"

## (undated) DEVICE — TR BAND, RADIAL COMPRESSION, STANDARD, 24CM

## (undated) DEVICE — CATHETER, DIAGNOSTIC, DXTERITY, 6FR PIG145, 110CM, 6 SH